# Patient Record
Sex: MALE | Race: WHITE | Employment: FULL TIME | URBAN - METROPOLITAN AREA
[De-identification: names, ages, dates, MRNs, and addresses within clinical notes are randomized per-mention and may not be internally consistent; named-entity substitution may affect disease eponyms.]

---

## 2018-07-02 ENCOUNTER — OFFICE VISIT (OUTPATIENT)
Dept: URGENT CARE | Age: 33
End: 2018-07-02

## 2018-07-02 VITALS
HEART RATE: 91 BPM | HEIGHT: 67 IN | TEMPERATURE: 97.8 F | SYSTOLIC BLOOD PRESSURE: 115 MMHG | BODY MASS INDEX: 21.03 KG/M2 | OXYGEN SATURATION: 99 % | WEIGHT: 134 LBS | RESPIRATION RATE: 18 BRPM | DIASTOLIC BLOOD PRESSURE: 74 MMHG

## 2018-07-02 DIAGNOSIS — R10.13 EPIGASTRIC PAIN: Primary | ICD-10-CM

## 2018-07-02 RX ORDER — MAG HYDROX/ALUMINUM HYD/SIMETH 200-200-20
30 SUSPENSION, ORAL (FINAL DOSE FORM) ORAL ONCE
Qty: 30 ML | Refills: 0 | Status: SHIPPED | COMMUNITY
Start: 2018-07-02 | End: 2018-07-02

## 2018-07-02 RX ORDER — CLONAZEPAM 0.5 MG/1
0.5 TABLET, ORALLY DISINTEGRATING ORAL
Qty: 3 TAB | Refills: 0 | Status: SHIPPED | OUTPATIENT
Start: 2018-07-02 | End: 2018-07-02 | Stop reason: CLARIF

## 2018-07-02 RX ORDER — BACLOFEN 20 MG/1
20 TABLET ORAL 2 TIMES DAILY
Qty: 6 TAB | Refills: 0 | Status: SHIPPED | OUTPATIENT
Start: 2018-07-02 | End: 2020-10-29

## 2018-07-02 RX ORDER — DICYCLOMINE HYDROCHLORIDE 20 MG/1
20 TABLET ORAL EVERY 6 HOURS
Qty: 8 TAB | Refills: 0 | Status: SHIPPED | OUTPATIENT
Start: 2018-07-02 | End: 2020-10-29

## 2018-07-02 RX ORDER — LIDOCAINE HYDROCHLORIDE 20 MG/ML
15 SOLUTION OROPHARYNGEAL
Qty: 15 ML | Refills: 0 | Status: SHIPPED | COMMUNITY
Start: 2018-07-02 | End: 2018-07-02

## 2018-07-02 NOTE — PROGRESS NOTES
Patient is a 27 y.o. male presenting with epigastric pain. Epigastric Pain   This is a new problem. The current episode started less than 1 hour ago. The problem occurs constantly. The problem has not changed since onset. Associated symptoms include chest pain and abdominal pain. Pertinent negatives include no shortness of breath. Associated symptoms comments: Epigastric area- sudden onset- constat- feels like sharp( spasm  Increases on movement involving abdomen  No hiccough/nausea. The symptoms are aggravated by bending and swallowing. Nothing relieves the symptoms. He has tried nothing for the symptoms. History reviewed. No pertinent past medical history. History reviewed. No pertinent surgical history. History reviewed. No pertinent family history. Social History     Social History    Marital status:      Spouse name: N/A    Number of children: N/A    Years of education: N/A     Occupational History    Not on file. Social History Main Topics    Smoking status: Former Smoker    Smokeless tobacco: Never Used    Alcohol use Not on file    Drug use: Not on file    Sexual activity: Not on file     Other Topics Concern    Not on file     Social History Narrative    No narrative on file                ALLERGIES: Review of patient's allergies indicates no known allergies. Review of Systems   Respiratory: Negative for shortness of breath. Cardiovascular: Positive for chest pain. Gastrointestinal: Positive for abdominal pain. All other systems reviewed and are negative. Vitals:    07/02/18 1903   BP: 115/74   Pulse: 91   Resp: 18   Temp: 97.8 °F (36.6 °C)   SpO2: 99%   Weight: 134 lb (60.8 kg)   Height: 5' 7\" (1.702 m)       Physical Exam   Constitutional: No distress.    HENT:   Right Ear: Tympanic membrane and ear canal normal.   Left Ear: Tympanic membrane and ear canal normal.   Nose: Nose normal.   Mouth/Throat: No oropharyngeal exudate, posterior oropharyngeal edema or posterior oropharyngeal erythema. Eyes: Conjunctivae are normal. Right eye exhibits no discharge. Left eye exhibits no discharge. Neck: Neck supple. Cardiovascular: Normal rate, regular rhythm and normal heart sounds. Exam reveals no gallop. No murmur heard. Pulmonary/Chest: Effort normal and breath sounds normal. No respiratory distress. He has no wheezes. He has no rales. Abdominal: Normal appearance. There is no hepatosplenomegaly. There is tenderness in the epigastric area. There is guarding. There is no rebound and no CVA tenderness. No hernia. Lymphadenopathy:     He has no cervical adenopathy. Skin: No rash noted. Nursing note and vitals reviewed. MDM    Procedures      ICD-10-CM ICD-9-CM    1. Epigastric pain R10.13 789.06 EKG, 12 LEAD, INITIAL      DISCONTINUED: clonazePAM (KLONOPIN) 0.5 mg TbDi disintegrating tablet    non cardiac - ? esophageal/ diaphragm spasm       Felt better with GI cocktail    advisied to take pepcid and Mylanta  If pain worsen - go to ED ASAP   Medications Ordered Today   Medications    baclofen (LIORESAL) 20 mg tablet     Sig: Take 1 Tab by mouth two (2) times a day. Dispense:  6 Tab     Refill:  0    dicyclomine (BENTYL) 20 mg tablet     Sig: Take 1 Tab by mouth every six (6) hours. Dispense:  8 Tab     Refill:  0    DISCONTD: clonazePAM (KLONOPIN) 0.5 mg TbDi disintegrating tablet     Sig: Take 1 Tab by mouth three (3) times daily as needed. Max Daily Amount: 1.5 mg.     Dispense:  3 Tab     Refill:  0    alum-mag hydroxide-simeth (MYLANTA) 200-200-20 mg/5 mL susp     Sig: Take 30 mL by mouth once for 1 dose.      Dispense:  30 mL     Refill:  0     Order Specific Question:   Expiration Date     Answer:   2/2/2020     Order Specific Question:   Lot#     Answer:   [id number]     Order Specific Question:        Answer:   Conrado Le     Order Specific Question:   NDC#     Answer:   [id number]    lidocaine (XYLOCAINE) 2 % solution Sig: Take 15 mL by mouth now for 1 dose. Dispense:  15 mL     Refill:  0     Order Specific Question:   Expiration Date     Answer:   10/2/2020     Order Specific Question:   Lot#     Answer:   [id number]     Order Specific Question:        Answer:   Albert Grant     Order Specific Question:   NDC#     Answer:   [id number]     No results found for any visits on 07/02/18. The patients condition was discussed with the patient and they understand. The patient is to follow up with primary care doctor. If signs and symptoms become worse the pt is to go to the ER. The patient is to take medications as prescribed.

## 2018-07-02 NOTE — MR AVS SNAPSHOT
[address] [place] [address] 
[phone] Patient: Catracho Griffith 
MRN: [id number] PWI:[id number] Visit Information Date & Time Provider Department Dept. Phone Encounter #  
 7/2/2018  7:00 PM [facility] [phone] [id number] Follow-up Instructions Return if symptoms worsen or fail to improve, for Follow up with PCP/ , Go to ED if sxs Worsen. Upcoming Health Maintenance Date Due DTaP/Tdap/Td series (1 - Tdap) 8/19/2004 Influenza Age 5 to Adult 8/1/2018 Allergies as of 7/2/2018  Review Complete On: 7/2/2018 By: Meet Saini RN No Known Allergies Current Immunizations  Never Reviewed No immunizations on file. Not reviewed this visit You Were Diagnosed With   
  
 Codes Comments Epigastric pain    -  Primary ICD-10-CM: R10.13 ICD-9-CM: 789.06 non cardiac - ? esophageal/ diaphragm spasm Vitals BP Pulse Temp Resp Height(growth percentile) Weight(growth percentile) 115/74 91 97.8 °F (36.6 °C) 18 5' 7\" (1.702 m) 134 lb (60.8 kg) SpO2 BMI Smoking Status 99% 20.99 kg/m2 Former Smoker BMI and BSA Data Body Mass Index Body Surface Area  
 20.99 kg/m 2 1.7 m 2 Your Updated Medication List  
  
   
This list is accurate as of 7/2/18  7:30 PM.  Always use your most recent med list.  
  
  
  
  
 baclofen 20 mg tablet Commonly known as:  LIORESAL Take 1 Tab by mouth two (2) times a day. clonazePAM 0.5 mg Tbdi disintegrating tablet Commonly known as:  Brea Nahun Take 1 Tab by mouth three (3) times daily as needed. Max Daily Amount: 1.5 mg.  
  
 dicyclomine 20 mg tablet Commonly known as:  BENTYL Take 1 Tab by mouth every six (6) hours. Prescriptions Printed Refills  
 baclofen (LIORESAL) 20 mg tablet 0 Sig: Take 1 Tab by mouth two (2) times a day. Class: Print  Route: Oral  
 dicyclomine (BENTYL) 20 mg tablet 0 Sig: Take 1 Tab by mouth every six (6) hours. Class: Print Route: Oral  
 clonazePAM (KLONOPIN) 0.5 mg TbDi disintegrating tablet 0 Sig: Take 1 Tab by mouth three (3) times daily as needed. Max Daily Amount: 1.5 mg.  
 Class: Print Route: Oral  
  
We Performed the Following EKG, 12 LEAD, INITIAL [76828 CPT(R)] Follow-up Instructions Return if symptoms worsen or fail to improve, for Follow up with PCP/ , Go to ED if sxs Worsen. Patient Instructions Abdominal Pain: Care Instructions Your Care Instructions Abdominal pain has many possible causes. Some aren't serious and get better on their own in a few days. Others need more testing and treatment. If your pain continues or gets worse, you need to be rechecked and may need more tests to find out what is wrong. You may need surgery to correct the problem. Don't ignore new symptoms, such as fever, nausea and vomiting, urination problems, pain that gets worse, and dizziness. These may be signs of a more serious problem. Your doctor may have recommended a follow-up visit in the next 8 to 12 hours. If you are not getting better, you may need more tests or treatment. The doctor has checked you carefully, but problems can develop later. If you notice any problems or new symptoms, get medical treatment right away. Follow-up care is a key part of your treatment and safety. Be sure to make and go to all appointments, and call your doctor if you are having problems. It's also a good idea to know your test results and keep a list of the medicines you take. How can you care for yourself at home? · Rest until you feel better. · To prevent dehydration, drink plenty of fluids, enough so that your urine is light yellow or clear like water. Choose water and other caffeine-free clear liquids until you feel better.  If you have kidney, heart, or liver disease and have to limit fluids, talk with your doctor before you increase the amount of fluids you drink. · If your stomach is upset, eat mild foods, such as rice, dry toast or crackers, bananas, and applesauce. Try eating several small meals instead of two or three large ones. · Wait until 48 hours after all symptoms have gone away before you have spicy foods, alcohol, and drinks that contain caffeine. · Do not eat foods that are high in fat. · Avoid anti-inflammatory medicines such as aspirin, ibuprofen (Advil, Motrin), and naproxen (Aleve). These can cause stomach upset. Talk to your doctor if you take daily aspirin for another health problem. When should you call for help? Call 911 anytime you think you may need emergency care. For example, call if: 
? · You passed out (lost consciousness). ? · You pass maroon or very bloody stools. ? · You vomit blood or what looks like coffee grounds. ? · You have new, severe belly pain. ?Call your doctor now or seek immediate medical care if: 
? · Your pain gets worse, especially if it becomes focused in one area of your belly. ? · You have a new or higher fever. ? · Your stools are black and look like tar, or they have streaks of blood. ? · You have unexpected vaginal bleeding. ? · You have symptoms of a urinary tract infection. These may include: 
¨ Pain when you urinate. ¨ Urinating more often than usual. 
¨ Blood in your urine. ? · You are dizzy or lightheaded, or you feel like you may faint. ? Watch closely for changes in your health, and be sure to contact your doctor if: 
? · You are not getting better after 1 day (24 hours). Where can you learn more? Go to http://adarsh-antonia.info/. Enter J078 in the search box to learn more about \"Abdominal Pain: Care Instructions. \" Current as of: March 20, 2017 Content Version: 11.4 © 4339-7336 Healthwise, Incorporated.  Care instructions adapted under license by [address] (which disclaims liability or warranty for this information). If you have questions about a medical condition or this instruction, always ask your healthcare professional. [address] any warranty or liability for your use of this information. Chest Pain: Care Instructions Your Care Instructions There are many things that can cause chest pain. Some are not serious and will get better on their own in a few days. But some kinds of chest pain need more testing and treatment. Your doctor may have recommended a follow-up visit in the next 8 to 12 hours. If you are not getting better, you may need more tests or treatment. Even though your doctor has released you, you still need to watch for any problems. The doctor carefully checked you, but sometimes problems can develop later. If you have new symptoms or if your symptoms do not get better, get medical care right away. If you have worse or different chest pain or pressure that lasts more than 5 minutes or you passed out (lost consciousness), call 911 or seek other emergency help right away. A medical visit is only one step in your treatment. Even if you feel better, you still need to do what your doctor recommends, such as going to all suggested follow-up appointments and taking medicines exactly as directed. This will help you recover and help prevent future problems. How can you care for yourself at home? · Rest until you feel better. · Take your medicine exactly as prescribed. Call your doctor if you think you are having a problem with your medicine. · Do not drive after taking a prescription pain medicine. When should you call for help? Call 911 if: 
? · You passed out (lost consciousness). ? · You have severe difficulty breathing. ? · You have symptoms of a heart attack. These may include: ¨ Chest pain or pressure, or a strange feeling in your chest. 
¨ Sweating. ¨ Shortness of breath. ¨ Nausea or vomiting. ¨ Pain, pressure, or a strange feeling in your back, neck, jaw, or upper belly or in one or both shoulders or arms. ¨ Lightheadedness or sudden weakness. ¨ A fast or irregular heartbeat. After you call 911, the  may tell you to chew 1 adult-strength or 2 to 4 low-dose aspirin. Wait for an ambulance. Do not try to drive yourself. ?Call your doctor today if: 
? · You have any trouble breathing. ? · Your chest pain gets worse. ? · You are dizzy or lightheaded, or you feel like you may faint. ? · You are not getting better as expected. ? · You are having new or different chest pain. Where can you learn more? Go to http://adarsh-antonia.info/. Enter A120 in the search box to learn more about \"Chest Pain: Care Instructions. \" Current as of: March 20, 2017 Content Version: 11.4 © 2561-4041 [employer]. Care instructions adapted under license by [employer] (which disclaims liability or warranty for this information). If you have questions about a medical condition or this instruction, always ask your healthcare professional. [place] [address] any warranty or liability for your use of this information. Introducing [facility]! [facility] introduces Indow Windows patient portal. Now you can access parts of your medical record, email your doctor's office, and request medication refills online. 1. In your internet browser, go to [email or web address] 2. Click on the First Time User? Click Here link in the Sign In box. You will see the New Member Sign Up page. 3. Enter your Indow Windows Access Code exactly as it appears below. You will not need to use this code after youve completed the sign-up process. If you do not sign up before the expiration date, you must request a new code. · Indow Windows Access Code: [email or web address] Expires: 9/30/2018  7:30 PM 
 
 4. Enter the last four digits of your Social Security Number (xxxx) and Date of Birth (mm/dd/yyyy) as indicated and click Submit. You will be taken to the next sign-up page. 5. Create a TRiQ ID. This will be your TRiQ login ID and cannot be changed, so think of one that is secure and easy to remember. 6. Create a TRiQ password. You can change your password at any time. 7. Enter your Password Reset Question and Answer. This can be used at a later time if you forget your password. 8. Enter your e-mail address. You will receive e-mail notification when new information is available in [address] 9. Click Sign Up. You can now view and download portions of your medical record. 10. Click the Download Summary menu link to download a portable copy of your medical information. If you have questions, please visit the Frequently Asked Questions section of the TRiQ website. Remember, TRiQ is NOT to be used for urgent needs. For medical emergencies, dial 911. Now available from your iPhone and Android! Please provide this summary of care documentation to your next provider. If you have any questions after today's visit, please call [phone].

## 2020-10-29 ENCOUNTER — HOSPITAL ENCOUNTER (EMERGENCY)
Age: 35
Discharge: HOME | End: 2020-10-29
Attending: STUDENT IN AN ORGANIZED HEALTH CARE EDUCATION/TRAINING PROGRAM
Payer: COMMERCIAL

## 2020-10-29 ENCOUNTER — APPOINTMENT (OUTPATIENT)
Dept: CT IMAGING | Age: 35
End: 2020-10-29
Attending: STUDENT IN AN ORGANIZED HEALTH CARE EDUCATION/TRAINING PROGRAM
Payer: COMMERCIAL

## 2020-10-29 ENCOUNTER — APPOINTMENT (OUTPATIENT)
Dept: GENERAL RADIOLOGY | Age: 35
End: 2020-10-29
Attending: EMERGENCY MEDICINE
Payer: COMMERCIAL

## 2020-10-29 VITALS
SYSTOLIC BLOOD PRESSURE: 127 MMHG | WEIGHT: 142.64 LBS | HEIGHT: 67 IN | DIASTOLIC BLOOD PRESSURE: 83 MMHG | RESPIRATION RATE: 23 BRPM | BODY MASS INDEX: 22.39 KG/M2 | TEMPERATURE: 98.1 F | OXYGEN SATURATION: 100 % | HEART RATE: 86 BPM

## 2020-10-29 DIAGNOSIS — K58.9 SPASM OF BOWEL: Primary | ICD-10-CM

## 2020-10-29 LAB
ALBUMIN SERPL-MCNC: 4 G/DL (ref 3.5–5)
ALBUMIN/GLOB SERPL: 0.9 {RATIO} (ref 1.1–2.2)
ALP SERPL-CCNC: 82 U/L (ref 45–117)
ALT SERPL-CCNC: 64 U/L (ref 12–78)
ANION GAP SERPL CALC-SCNC: 5 MMOL/L (ref 5–15)
AST SERPL-CCNC: 124 U/L (ref 15–37)
ATRIAL RATE: 88 BPM
BASOPHILS # BLD: 0 K/UL (ref 0–0.1)
BASOPHILS NFR BLD: 1 % (ref 0–1)
BILIRUB SERPL-MCNC: 0.5 MG/DL (ref 0.2–1)
BNP SERPL-MCNC: 5 PG/ML
BUN SERPL-MCNC: 17 MG/DL (ref 6–20)
BUN/CREAT SERPL: 18 (ref 12–20)
CALCIUM SERPL-MCNC: 9 MG/DL (ref 8.5–10.1)
CALCULATED R AXIS, ECG10: 49 DEGREES
CALCULATED T AXIS, ECG11: 72 DEGREES
CHLORIDE SERPL-SCNC: 109 MMOL/L (ref 97–108)
CO2 SERPL-SCNC: 23 MMOL/L (ref 21–32)
CREAT SERPL-MCNC: 0.97 MG/DL (ref 0.7–1.3)
D DIMER PPP FEU-MCNC: 0.3 MG/L FEU (ref 0–0.65)
DIAGNOSIS, 93000: NORMAL
DIFFERENTIAL METHOD BLD: ABNORMAL
EOSINOPHIL # BLD: 0.1 K/UL (ref 0–0.4)
EOSINOPHIL NFR BLD: 1 % (ref 0–7)
ERYTHROCYTE [DISTWIDTH] IN BLOOD BY AUTOMATED COUNT: 11.8 % (ref 11.5–14.5)
GLOBULIN SER CALC-MCNC: 4.3 G/DL (ref 2–4)
GLUCOSE SERPL-MCNC: 96 MG/DL (ref 65–100)
HCT VFR BLD AUTO: 43.5 % (ref 36.6–50.3)
HGB BLD-MCNC: 14.7 G/DL (ref 12.1–17)
IMM GRANULOCYTES # BLD AUTO: 0 K/UL (ref 0–0.04)
IMM GRANULOCYTES NFR BLD AUTO: 1 % (ref 0–0.5)
LIPASE SERPL-CCNC: 475 U/L (ref 73–393)
LYMPHOCYTES # BLD: 1.4 K/UL (ref 0.8–3.5)
LYMPHOCYTES NFR BLD: 17 % (ref 12–49)
MCH RBC QN AUTO: 28.9 PG (ref 26–34)
MCHC RBC AUTO-ENTMCNC: 33.8 G/DL (ref 30–36.5)
MCV RBC AUTO: 85.6 FL (ref 80–99)
MONOCYTES # BLD: 0.4 K/UL (ref 0–1)
MONOCYTES NFR BLD: 5 % (ref 5–13)
NEUTS SEG # BLD: 6.2 K/UL (ref 1.8–8)
NEUTS SEG NFR BLD: 75 % (ref 32–75)
NRBC # BLD: 0 K/UL (ref 0–0.01)
NRBC BLD-RTO: 0 PER 100 WBC
P-R INTERVAL, ECG05: 168 MS
PLATELET # BLD AUTO: 185 K/UL (ref 150–400)
PMV BLD AUTO: 8.9 FL (ref 8.9–12.9)
POTASSIUM SERPL-SCNC: 3.9 MMOL/L (ref 3.5–5.1)
PROT SERPL-MCNC: 8.3 G/DL (ref 6.4–8.2)
Q-T INTERVAL, ECG07: 340 MS
QRS DURATION, ECG06: 74 MS
QTC CALCULATION (BEZET), ECG08: 411 MS
RBC # BLD AUTO: 5.08 M/UL (ref 4.1–5.7)
SODIUM SERPL-SCNC: 137 MMOL/L (ref 136–145)
TROPONIN I SERPL-MCNC: <0.05 NG/ML
VENTRICULAR RATE, ECG03: 88 BPM
WBC # BLD AUTO: 8.1 K/UL (ref 4.1–11.1)

## 2020-10-29 PROCEDURE — 85025 COMPLETE CBC W/AUTO DIFF WBC: CPT

## 2020-10-29 PROCEDURE — 83690 ASSAY OF LIPASE: CPT

## 2020-10-29 PROCEDURE — 74011250637 HC RX REV CODE- 250/637: Performed by: STUDENT IN AN ORGANIZED HEALTH CARE EDUCATION/TRAINING PROGRAM

## 2020-10-29 PROCEDURE — 80053 COMPREHEN METABOLIC PANEL: CPT

## 2020-10-29 PROCEDURE — 93005 ELECTROCARDIOGRAM TRACING: CPT

## 2020-10-29 PROCEDURE — 83880 ASSAY OF NATRIURETIC PEPTIDE: CPT

## 2020-10-29 PROCEDURE — 99284 EMERGENCY DEPT VISIT MOD MDM: CPT

## 2020-10-29 PROCEDURE — 74177 CT ABD & PELVIS W/CONTRAST: CPT

## 2020-10-29 PROCEDURE — 85379 FIBRIN DEGRADATION QUANT: CPT

## 2020-10-29 PROCEDURE — 36415 COLL VENOUS BLD VENIPUNCTURE: CPT

## 2020-10-29 PROCEDURE — 84484 ASSAY OF TROPONIN QUANT: CPT

## 2020-10-29 PROCEDURE — 74011000636 HC RX REV CODE- 636

## 2020-10-29 PROCEDURE — 71046 X-RAY EXAM CHEST 2 VIEWS: CPT

## 2020-10-29 RX ORDER — SODIUM CHLORIDE 0.9 % (FLUSH) 0.9 %
10 SYRINGE (ML) INJECTION
Status: COMPLETED | OUTPATIENT
Start: 2020-10-29 | End: 2020-10-29

## 2020-10-29 RX ORDER — DICYCLOMINE HYDROCHLORIDE 10 MG/1
10 CAPSULE ORAL 4 TIMES DAILY
Qty: 20 CAP | Refills: 0 | Status: SHIPPED | OUTPATIENT
Start: 2020-10-29 | End: 2020-11-03

## 2020-10-29 RX ORDER — DICYCLOMINE HYDROCHLORIDE 10 MG/1
20 CAPSULE ORAL
Status: COMPLETED | OUTPATIENT
Start: 2020-10-29 | End: 2020-10-29

## 2020-10-29 RX ADMIN — IOPAMIDOL 100 ML: 755 INJECTION, SOLUTION INTRAVENOUS at 06:55

## 2020-10-29 RX ADMIN — DICYCLOMINE HYDROCHLORIDE 20 MG: 10 CAPSULE ORAL at 07:44

## 2020-10-29 RX ADMIN — Medication 10 ML: at 06:55

## 2020-10-29 NOTE — ED NOTES
Received report from DONALD Laboy. This RN was informed of patient chief complaint, current status, orders completed (to include IV access/medications/radiology testing), outstanding orders that still need to be completed, and the treatment plan. All questions and concerns regarding the patient were addressed.

## 2020-10-29 NOTE — DISCHARGE INSTRUCTIONS
[facility] SYSTEMS Departments     For adult and child immunizations, family planning, TB screening, STD testing and women's health services. [facility]: [place] [phone]      [facility] 25   [address]   [address]   170 [facility]: Joyce Diop [address] [facility] [address] [phone]      2400 [facility]          [place] [place] [address]     For primary care services, woman and child wellness, and some clinics providing specialty care. [facility] -- [address] 2525 [facility] [phone]   411 [facility] 200 [facility] 3614 [facility] [phone]   339 [facility] [address] [phone] [address]  [email or web address] [address] [place] [address] 5850  Community  [phone]   [address] [place] [address] [address] [phone]   [facility] 81 [facility] [phone]   [facility] [address] [place] [address] [phone]   [facility]: [facility] [address], ext [address] [place] [address], [address] [phone]     [facility] [address] [facility] [address] [phone]   [facility] 5850 [facility]  [phone]   [employer]  [address], [address] ([email or web address]) [phone]         Sexual Health/Woman Wellness Clinics    For STD/HIV testing and treatment, pregnancy testing and services, men's health, birth control services, LGBT services, and hepatitis/HPV vaccine services. [facility] for [place] [employer] [address] [facility] [address] [facility] [address] [facility] 1579 [address]gu[address] [phone]   [facility] [address], 5th floor [phone]   Pregnancy [address] for Women [facility] DUSTY Sorto [phone]         Specialty Service 1701 [facility]   [phone]   [place]   [phone]   Women, Infant and Children's Services: [address] [phone]       600 [facility]   [phone]   [address]   1620 [facility]     [phone]   [address] Crisis   [address] [facility] [address] [phone]       Local Primary Care Physicians  [facility] [phone]  MD Spencer Saxena MD Rande French, MD [facility] Doctors [phone]  Rosa Isela Contreras, P  MD Prem Carpenter MD Emerick Serge, MD [address] [place] [address] [phone]  MD Marco Gorman MD [address] [facility] [phone]  MD Bianca Montes MD Karilyn Asal, MD Rosemarie Alas, MD   [facility] [phone]  [facility] , MD Su Londono, NP [address] [phone]  MD Jhon Garcia MD Donell Martinez, MD Ferdinand Carl, MD Unknown Abernethy, MD Margarett Hopes, MD Evone Cornet, MD   33 57 [facility]  Marixa Canales MD [address] [phone]  MD Dario Sen, MD Conrad Amin MD Jacolyn Lah, MD Fatima Oak, MD Orlin Kettle, MD   9615 [facility] [phone]  MD Kathryn Daily, FNP  Pilo Vegas, NP  MD Haleigh Lemus MD Randon Downy, MD Mora Bayley, MD [facility] [phone]  MD Daniella Narvaez MD Olena Gavia, MD Cathlyn Garner, MD Charlyn Mons, MD   [address] [phone]  MD Raúl Pimentel MD [place] [phone]  MD Saran Patel MD Ritchie Morning Marcela [place], [address] [facility] [phone]  MD Isaac Jacques, MD Jorge Angel, MD Penelope Hernandez, MD Teri Carver, ADRIANA Gold MD [address]   [phone]  MD Wiliam Valdes [facility], MD Dick Mathew MD   2102 [facility] [phone]  Twyla Gonzalez, MD Karina Snyder, FNP  Ulysses Corporal, PA-C  Ulysses Corporal, FNP  Macy Lamp, PA-C  Yohan Mayers, MD José Kapadia, ADRIANA Roy, DO Miscellaneous:  Dre Corrigan [phone]

## 2020-10-29 NOTE — LETTER
Mirandamanny Johnson was seen and treated in our emergency department on 10/29/2020. He may return to work on 10/30/2020 If you have any questions or concerns, please don't hesitate to call. Dr. Jd Toney

## 2020-10-29 NOTE — ED NOTES
Patient w abdominal/chest pain onset about 0200. Patient denies any pain like this before. He states he is unable to lay down. Denies any SOB. Dr. Robin Kc to bedside for evaluation w this writer at this time. 3093- Bedside shift change report given to Greta Nolan RN (oncoming nurse) by Ara Mayes RN (offgoing nurse). Report included the following information SBAR, Kardex and Recent Results.

## 2020-10-29 NOTE — ED PROVIDER NOTES
EMERGENCY DEPARTMENT HISTORY AND PHYSICAL EXAM      Date: 10/29/2020  Patient Name: Francisca Greer    History of Presenting Illness     Chief Complaint   Patient presents with    Chest Pain     Pt ambulatory to triage with c/o mid CP radiating from mid abdomen; denies N/V/D; denies cardiac or GI hx    Abdominal Pain       History Provided By: Patient    HPI: Francisca Greer, 38 y.o. male presents to the ED with cc of upper abdomen/lower chest pain that began overnight. Patient states that he has never had symptoms like this before. Denies radiation of pain. Denies associated N/V/D/urinary complaints/fevers/chills. There are no other complaints, changes, or physical findings at this time. PCP: None    No current facility-administered medications on file prior to encounter. Current Outpatient Medications on File Prior to Encounter   Medication Sig Dispense Refill    [DISCONTINUED] baclofen (LIORESAL) 20 mg tablet Take 1 Tab by mouth two (2) times a day. 6 Tab 0    [DISCONTINUED] dicyclomine (BENTYL) 20 mg tablet Take 1 Tab by mouth every six (6) hours. 8 Tab 0       Past History     Past Medical History:  History reviewed. No pertinent past medical history. Past Surgical History:  History reviewed. No pertinent surgical history. Family History:  History reviewed. No pertinent family history. Social History:  Social History     Tobacco Use    Smoking status: Former Smoker    Smokeless tobacco: Never Used   Substance Use Topics    Alcohol use: Not Currently    Drug use: Not Currently       Allergies:  No Known Allergies      Review of Systems   Review of Systems   Constitutional: Negative for chills and fever. HENT: Negative for congestion and rhinorrhea. Eyes: Negative for visual disturbance. Respiratory: Negative for chest tightness and shortness of breath. Cardiovascular: Negative for chest pain and palpitations. Gastrointestinal: Positive for abdominal pain.  Negative for diarrhea, nausea and vomiting. Genitourinary: Negative for dysuria, flank pain and hematuria. Musculoskeletal: Negative for back pain and neck pain. Skin: Negative for rash. Allergic/Immunologic: Negative for immunocompromised state. Neurological: Negative for dizziness, speech difficulty, weakness and headaches. Hematological: Negative for adenopathy. Psychiatric/Behavioral: Negative for dysphoric mood and suicidal ideas. Physical Exam   Physical Exam  Vitals signs and nursing note reviewed. Constitutional:       General: He is not in acute distress. Appearance: Normal appearance. He is not ill-appearing or toxic-appearing. HENT:      Head: Normocephalic and atraumatic. Nose: Nose normal.      Mouth/Throat:      Mouth: Mucous membranes are moist.   Eyes:      Extraocular Movements: Extraocular movements intact. Pupils: Pupils are equal, round, and reactive to light. Neck:      Musculoskeletal: Normal range of motion and neck supple. Cardiovascular:      Rate and Rhythm: Normal rate and regular rhythm. Pulses: Normal pulses. Pulmonary:      Effort: Pulmonary effort is normal. No respiratory distress. Breath sounds: Normal breath sounds. No stridor. No wheezing or rhonchi. Abdominal:      General: Abdomen is flat. There is no distension. Palpations: There is no mass. Tenderness: There is abdominal tenderness in the right upper quadrant and epigastric area. There is no right CVA tenderness, left CVA tenderness, guarding or rebound. Musculoskeletal: Normal range of motion. Skin:     General: Skin is warm and dry. Neurological:      General: No focal deficit present. Mental Status: He is alert. Mental status is at baseline. Sensory: No sensory deficit. Motor: No weakness.          Diagnostic Study Results     Labs -     Recent Results (from the past 12 hour(s))   EKG, 12 LEAD, INITIAL    Collection Time: 10/29/20  4:31 AM   Result Value Ref Range Ventricular Rate 88 BPM    Atrial Rate 88 BPM    P-R Interval 168 ms    QRS Duration 74 ms    Q-T Interval 340 ms    QTC Calculation (Bezet) 411 ms    Calculated R Axis 49 degrees    Calculated T Axis 72 degrees    Diagnosis       Normal sinus rhythm  Septal infarct , age undetermined  No previous ECGs available     CBC WITH AUTOMATED DIFF    Collection Time: 10/29/20  4:39 AM   Result Value Ref Range    WBC 8.1 4.1 - 11.1 K/uL    RBC 5.08 4.10 - 5.70 M/uL    HGB 14.7 12.1 - 17.0 g/dL    HCT 43.5 36.6 - 50.3 %    MCV 85.6 80.0 - 99.0 FL    MCH 28.9 26.0 - 34.0 PG    MCHC 33.8 30.0 - 36.5 g/dL    RDW 11.8 11.5 - 14.5 %    PLATELET 466 741 - 289 K/uL    MPV 8.9 8.9 - 12.9 FL    NRBC 0.0 0  WBC    ABSOLUTE NRBC 0.00 0.00 - 0.01 K/uL    NEUTROPHILS 75 32 - 75 %    LYMPHOCYTES 17 12 - 49 %    MONOCYTES 5 5 - 13 %    EOSINOPHILS 1 0 - 7 %    BASOPHILS 1 0 - 1 %    IMMATURE GRANULOCYTES 1 (H) 0.0 - 0.5 %    ABS. NEUTROPHILS 6.2 1.8 - 8.0 K/UL    ABS. LYMPHOCYTES 1.4 0.8 - 3.5 K/UL    ABS. MONOCYTES 0.4 0.0 - 1.0 K/UL    ABS. EOSINOPHILS 0.1 0.0 - 0.4 K/UL    ABS. BASOPHILS 0.0 0.0 - 0.1 K/UL    ABS. IMM. GRANS. 0.0 0.00 - 0.04 K/UL    DF AUTOMATED     METABOLIC PANEL, COMPREHENSIVE    Collection Time: 10/29/20  4:39 AM   Result Value Ref Range    Sodium 137 136 - 145 mmol/L    Potassium 3.9 3.5 - 5.1 mmol/L    Chloride 109 (H) 97 - 108 mmol/L    CO2 23 21 - 32 mmol/L    Anion gap 5 5 - 15 mmol/L    Glucose 96 65 - 100 mg/dL    BUN 17 6 - 20 MG/DL    Creatinine 0.97 0.70 - 1.30 MG/DL    BUN/Creatinine ratio 18 12 - 20      GFR est AA >60 >60 ml/min/1.73m2    GFR est non-AA >60 >60 ml/min/1.73m2    Calcium 9.0 8.5 - 10.1 MG/DL    Bilirubin, total 0.5 0.2 - 1.0 MG/DL    ALT (SGPT) 64 12 - 78 U/L    AST (SGOT) 124 (H) 15 - 37 U/L    Alk.  phosphatase 82 45 - 117 U/L    Protein, total 8.3 (H) 6.4 - 8.2 g/dL    Albumin 4.0 3.5 - 5.0 g/dL    Globulin 4.3 (H) 2.0 - 4.0 g/dL    A-G Ratio 0.9 (L) 1.1 - 2.2     NT-PRO BNP Collection Time: 10/29/20  4:39 AM   Result Value Ref Range    NT pro-BNP 5 <125 PG/ML   TROPONIN I    Collection Time: 10/29/20  4:39 AM   Result Value Ref Range    Troponin-I, Qt. <0.05 <0.05 ng/mL   D DIMER    Collection Time: 10/29/20  4:39 AM   Result Value Ref Range    D-dimer 0.30 0.00 - 0.65 mg/L FEU       Radiologic Studies -   XR CHEST PA LAT   Final Result   IMPRESSION: No acute intrathoracic disease. CT ABD PELV W CONT    (Results Pending)     CT Results  (Last 48 hours)    None        CXR Results  (Last 48 hours)               10/29/20 0627  XR CHEST PA LAT Final result    Impression:  IMPRESSION: No acute intrathoracic disease. Narrative:  Clinical history: cp   INDICATION:   cp   COMPARISON: None       FINDINGS:    PA and lateral views of the chest are obtained. The cardiopericardial silhouette is within normal limits. There is no pleural   effusion, pneumothorax or focal consolidation present. Medical Decision Making   I am the first provider for this patient. I reviewed the vital signs, available nursing notes, past medical history, past surgical history, family history and social history. Vital Signs-Reviewed the patient's vital signs. Patient Vitals for the past 12 hrs:   Temp Pulse Resp BP SpO2   10/29/20 0630 -- 87 23 123/76 100 %   10/29/20 0620 -- -- -- 130/78 --   10/29/20 0442 97.9 °F (36.6 °C) 84 18 -- 100 %       EKG interpretation: (Preliminary)  NSR, rate of 88, no acute ST changes. Normal Qtc. Initial interpretation by Jazlyn Hernandes MD    Records Reviewed: Nursing Notes, Old Medical Records, Previous electrocardiograms, Previous Radiology Studies and Previous Laboratory Studies    Provider Notes (Medical Decision Making):   43-year-old male presenting for evaluation of upper abdomen/lower chest pain. Vitals are stable. EKG is nonischemic. Troponin is negative. BNP and D-dimer ordered by triage, and both are negative.   Basic labs without leukocytosis. LFTs, renal function, electrolytes within normal limits. Lipase mildly elevated at 475, does not meet criteria for pancreatitis. CT abdomen pelvis negative for acute process to explain patient's present symptoms. Reviewed patient's chart, despite him saying he has never experienced similar symptoms-it appears that he visited urgent care in July 2018 with a similar presentation. When questioned about this, patient now states maybe he has had similar symptoms. It appears that he received Bentyl on that visit, and patient endorses previously experiencing relief of symptoms with this medication. He was subsequently given 20 mg of Bentyl here. He will be discharged home in stable condition with a prescription for Bentyl as well. Advised to follow-up with primary care physician. Return precautions discussed. ED Course:   Initial assessment performed. The patients presenting problems have been discussed, and they are in agreement with the care plan formulated and outlined with them. I have encouraged them to ask questions as they arise throughout their visit. William España MD      Disposition:  Discharge    DISCHARGE PLAN:  1. Discharge Medication List as of 10/29/2020  7:38 AM      START taking these medications    Details   dicyclomine (BentyL) 10 mg capsule Take 1 Cap by mouth four (4) times daily for 5 days. , Normal, Disp-20 Cap,R-0           2. Follow-up Information     Follow up With Specialties Details Why Contact Info    PCP            3. Return to ED if worse     Diagnosis     Clinical Impression:   1. Spasm of bowel        Attestations:    William España MD    Please note that this dictation was completed with Sean[employer], the computer voice recognition software. Quite often unanticipated grammatical, syntax, homophones, and other interpretive errors are inadvertently transcribed by the computer software. Please disregard these errors.   Please excuse any errors that have escaped final proofreading. Thank you.

## 2022-04-07 ENCOUNTER — HOSPITAL ENCOUNTER (EMERGENCY)
Age: 37
Discharge: HOME | End: 2022-04-08
Attending: EMERGENCY MEDICINE
Payer: COMMERCIAL

## 2022-04-07 VITALS
DIASTOLIC BLOOD PRESSURE: 83 MMHG | HEART RATE: 61 BPM | HEIGHT: 67 IN | TEMPERATURE: 98 F | SYSTOLIC BLOOD PRESSURE: 112 MMHG | OXYGEN SATURATION: 100 % | WEIGHT: 150 LBS | BODY MASS INDEX: 23.54 KG/M2 | RESPIRATION RATE: 18 BRPM

## 2022-04-07 DIAGNOSIS — R42 VERTIGO: Primary | ICD-10-CM

## 2022-04-07 PROCEDURE — 93005 ELECTROCARDIOGRAM TRACING: CPT

## 2022-04-07 PROCEDURE — 85025 COMPLETE CBC W/AUTO DIFF WBC: CPT

## 2022-04-07 PROCEDURE — 36415 COLL VENOUS BLD VENIPUNCTURE: CPT

## 2022-04-07 PROCEDURE — 80053 COMPREHEN METABOLIC PANEL: CPT

## 2022-04-07 PROCEDURE — 99285 EMERGENCY DEPT VISIT HI MDM: CPT

## 2022-04-08 ENCOUNTER — APPOINTMENT (OUTPATIENT)
Dept: CT IMAGING | Age: 37
End: 2022-04-08
Attending: EMERGENCY MEDICINE
Payer: COMMERCIAL

## 2022-04-08 LAB
ALBUMIN SERPL-MCNC: 3.8 G/DL (ref 3.5–5)
ALBUMIN/GLOB SERPL: 1 {RATIO} (ref 1.1–2.2)
ALP SERPL-CCNC: 61 U/L (ref 45–117)
ALT SERPL-CCNC: 26 U/L (ref 12–78)
ANION GAP SERPL CALC-SCNC: 5 MMOL/L (ref 5–15)
AST SERPL-CCNC: 19 U/L (ref 15–37)
BASOPHILS # BLD: 0 K/UL (ref 0–0.1)
BASOPHILS NFR BLD: 1 % (ref 0–1)
BILIRUB SERPL-MCNC: 0.2 MG/DL (ref 0.2–1)
BUN SERPL-MCNC: 16 MG/DL (ref 6–20)
BUN/CREAT SERPL: 17 (ref 12–20)
CALCIUM SERPL-MCNC: 8.7 MG/DL (ref 8.5–10.1)
CHLORIDE SERPL-SCNC: 105 MMOL/L (ref 97–108)
CO2 SERPL-SCNC: 28 MMOL/L (ref 21–32)
CREAT SERPL-MCNC: 0.93 MG/DL (ref 0.7–1.3)
DIFFERENTIAL METHOD BLD: ABNORMAL
EOSINOPHIL # BLD: 0.2 K/UL (ref 0–0.4)
EOSINOPHIL NFR BLD: 2 % (ref 0–7)
ERYTHROCYTE [DISTWIDTH] IN BLOOD BY AUTOMATED COUNT: 11.8 % (ref 11.5–14.5)
GLOBULIN SER CALC-MCNC: 3.9 G/DL (ref 2–4)
GLUCOSE SERPL-MCNC: 110 MG/DL (ref 65–100)
HCT VFR BLD AUTO: 40.6 % (ref 36.6–50.3)
HGB BLD-MCNC: 13.8 G/DL (ref 12.1–17)
IMM GRANULOCYTES # BLD AUTO: 0 K/UL (ref 0–0.04)
IMM GRANULOCYTES NFR BLD AUTO: 0 % (ref 0–0.5)
LYMPHOCYTES # BLD: 2.2 K/UL (ref 0.8–3.5)
LYMPHOCYTES NFR BLD: 27 % (ref 12–49)
MCH RBC QN AUTO: 29.5 PG (ref 26–34)
MCHC RBC AUTO-ENTMCNC: 34 G/DL (ref 30–36.5)
MCV RBC AUTO: 86.8 FL (ref 80–99)
MONOCYTES # BLD: 0.9 K/UL (ref 0–1)
MONOCYTES NFR BLD: 11 % (ref 5–13)
NEUTS SEG # BLD: 4.8 K/UL (ref 1.8–8)
NEUTS SEG NFR BLD: 59 % (ref 32–75)
NRBC # BLD: 0 K/UL (ref 0–0.01)
NRBC BLD-RTO: 0 PER 100 WBC
PLATELET # BLD AUTO: 198 K/UL (ref 150–400)
PMV BLD AUTO: 8.4 FL (ref 8.9–12.9)
POTASSIUM SERPL-SCNC: 3.8 MMOL/L (ref 3.5–5.1)
PROT SERPL-MCNC: 7.7 G/DL (ref 6.4–8.2)
RBC # BLD AUTO: 4.68 M/UL (ref 4.1–5.7)
SODIUM SERPL-SCNC: 138 MMOL/L (ref 136–145)
WBC # BLD AUTO: 8.1 K/UL (ref 4.1–11.1)

## 2022-04-08 PROCEDURE — 96374 THER/PROPH/DIAG INJ IV PUSH: CPT

## 2022-04-08 PROCEDURE — 74011000636 HC RX REV CODE- 636: Performed by: EMERGENCY MEDICINE

## 2022-04-08 PROCEDURE — 70496 CT ANGIOGRAPHY HEAD: CPT

## 2022-04-08 PROCEDURE — 74011250636 HC RX REV CODE- 250/636

## 2022-04-08 PROCEDURE — 74011250636 HC RX REV CODE- 250/636: Performed by: EMERGENCY MEDICINE

## 2022-04-08 RX ORDER — ONDANSETRON 2 MG/ML
4 INJECTION INTRAMUSCULAR; INTRAVENOUS
Status: COMPLETED | OUTPATIENT
Start: 2022-04-08 | End: 2022-04-08

## 2022-04-08 RX ORDER — LORAZEPAM 2 MG/ML
1 INJECTION INTRAMUSCULAR
Status: DISCONTINUED | OUTPATIENT
Start: 2022-04-08 | End: 2022-04-08 | Stop reason: HOSPADM

## 2022-04-08 RX ORDER — ONDANSETRON 2 MG/ML
INJECTION INTRAMUSCULAR; INTRAVENOUS
Status: COMPLETED
Start: 2022-04-08 | End: 2022-04-08

## 2022-04-08 RX ORDER — MECLIZINE HCL 12.5 MG 12.5 MG/1
50 TABLET ORAL
Status: COMPLETED | OUTPATIENT
Start: 2022-04-08 | End: 2022-04-08

## 2022-04-08 RX ORDER — MECLIZINE HYDROCHLORIDE 25 MG/1
25 TABLET ORAL
Qty: 20 TABLET | Refills: 0 | Status: SHIPPED | OUTPATIENT
Start: 2022-04-08 | End: 2022-06-13

## 2022-04-08 RX ADMIN — ONDANSETRON HYDROCHLORIDE 4 MG: 2 INJECTION, SOLUTION INTRAMUSCULAR; INTRAVENOUS at 01:02

## 2022-04-08 RX ADMIN — ONDANSETRON 4 MG: 2 INJECTION INTRAMUSCULAR; INTRAVENOUS at 01:02

## 2022-04-08 RX ADMIN — MECLIZINE 50 MG: 12.5 TABLET ORAL at 00:47

## 2022-04-08 RX ADMIN — IOPAMIDOL 100 ML: 755 INJECTION, SOLUTION INTRAVENOUS at 01:23

## 2022-04-08 NOTE — ED PROVIDER NOTES
EMERGENCY DEPARTMENT HISTORY AND PHYSICAL EXAM      Date: 4/7/2022  Patient Name: Steph Adair    History of Presenting Illness     Chief Complaint   Patient presents with    Dizziness     pt presents with c/o dizziness that started around 2015 per pt, states that everything feels like it is spinning. Pt A&Ox4, VS stable       History Provided By: Patient    HPI: Steph Adair, 43 y.o. male with PMHx as noted below presents the emergency department chief complaint of vertigo. Patient states that approximately 8 PM woke up from a nap and felt as though the room was spinning. Patient states he felt as though his equilibrium was off. Patient notes that symptoms are absent when he is not moving his head however symptoms do come on with any head movement or standing. When symptoms are present they are severe and has associated nausea. Currently at rest he is asymptomatic. He denies any visual changes, focal deficits, speech difficulty. Pt denies any other alleviating or exacerbating factors. Additionally, pt specifically denies any recent fever, chills, headache, nausea, vomiting, abdominal pain, CP, SOB,, numbness, weakness, BLE swelling, heart palpitations, urinary sxs, diarrhea, constipation, melena, hematochezia, cough, or congestion. PCP: None        Past History     Past Medical History:  History reviewed. No pertinent past medical history. Past Surgical History:  History reviewed. No pertinent surgical history. Family History:  History reviewed. No pertinent family history. Social History:  Social History     Tobacco Use    Smoking status: Former Smoker    Smokeless tobacco: Never Used   Substance Use Topics    Alcohol use: Not Currently    Drug use: Not Currently       Allergies:  No Known Allergies      Review of Systems   Review of Systems  Constitutional: Negative for fever, chills, and fatigue.    HENT: Negative for congestion, sore throat, rhinorrhea, sneezing and neck stiffness   Eyes: Negative for discharge and redness. Respiratory: Negative for  shortness of breath, wheezing   Cardiovascular: Negative for chest pain, palpitations   Gastrointestinal: Negative for nausea, vomiting, abdominal pain, constipation, diarrhea and blood in stool. Genitourinary: Negative for dysuria, hematuria, flank pain, decreased urine volume, discharge,   Musculoskeletal: Negative for myalgias or joint pain . Skin: Negative for rash or lesions . Neurological: Negative weakness, , numbness and headaches. Physical Exam   Physical Exam    GENERAL: alert and oriented, no acute distress  EYES: PEERL, No injection, discharge or icterus. ENT: Mucous membranes pink and moist.  NECK: Supple  LUNGS: Airway patent. Non-labored respirations. Breath sounds clear with good air entry bilaterally. HEART: Regular rate and rhythm. No peripheral edema  ABDOMEN: Non-distended and non-tender, without guarding or rebound. SKIN:  warm, dry  MSK/EXTREMITIES: Without swelling, tenderness or deformity, symmetric with normal ROM  NEUROLOGICAL:  alert and oriented x 3, CN II-XII grossly intact, strength 5/5 bilateral upper and lower extremities, sensation intact throughout to light touch, no dysmetria or ataxia noted. Unilateral horizontal nystagmus noted on head movement. Diagnostic Study Results     Labs -     reviewed and interpreted by me    Radiologic Studies -   CTA HEAD NECK W CONT   Final Result   1. 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 2.  Indeterminate 5 mm right apical nodule, probably infectious or inflammatory. Consider 12 month follow-up CT in high risk patients. CT Results  (Last 48 hours)    None        CXR Results  (Last 48 hours)    None            Medical Decision Making     IDeep MD am the first provider for this patient and am the attending of record for this patient encounter.     I reviewed the vital signs, available nursing notes, past medical history, past surgical history, family history and social history. Vital Signs-Reviewed the patient's vital signs. No data found. EKG interpretation: (Preliminary)  Rhythm: normal sinus rhythm; and regular . Rate (approx.): 65; Axis: normal; P wave: normal; QRS interval: normal ; ST/T wave: Nonspecific changes;  was interpreted by Abbey Machado MD,ED Provider. Records Reviewed: Nursing Notes and Old Medical Records    Provider Notes (Medical Decision Making): On presentation, the patient is well appearing, in no acute distress with normal vital signs. Based on my history and exam the differential diagnosis for this patient includes BPPV, meniers, labyrinthitis, stroke, vertebral dissection. Imaging showed no acute findings, labs reassuring. Patient given meclizine with improvement. considered posterior stroke however sx are only present on movement/position change, no abnormal findings on neuro exam and improvement with meclizine make stroke less likely, additionally does not have any significant risk factors. Patient made aware of incedental CT findings  And referred to PT. ED Course:   Initial assessment performed. The patients presenting problems have been discussed, and they are in agreement with the care plan formulated and outlined with them. I have encouraged them to ask questions as they arise throughout their visit. Medications   meclizine (ANTIVERT) tablet 50 mg (50 mg Oral Given 4/8/22 0047)   ondansetron (ZOFRAN) injection 4 mg (4 mg IntraVENous Given 4/8/22 0102)   iopamidoL (ISOVUE-370) 76 % injection 100 mL (100 mL IntraVENous Given 4/8/22 0123)         PROGRESS  Primo Lawson  results have been reviewed with him. He has been counseled regarding his diagnosis. He verbally conveys understanding and agreement of the signs, symptoms, diagnosis, treatment and prognosis and additionally agrees to follow up as recommended with Dr. None in 24 - 48 hours.   He also agrees with the care-plan and conveys that all of his questions have been answered. I have also put together some discharge instructions for him that include: 1) educational information regarding their diagnosis, 2) how to care for their diagnosis at home, as well a 3) list of reasons why they would want to return to the ED prior to their follow-up appointment, should their condition change. Disposition:  home    PLAN:  1. Discharge Medication List as of 4/8/2022  3:18 AM      START taking these medications    Details   meclizine (ANTIVERT) 25 mg tablet Take 1 Tablet by mouth three (3) times daily as needed for Dizziness., Normal, Disp-20 Tablet, R-0           2. Follow-up Information     Follow up With Specialties Details Why Contact Info    [facility] EMERGENCY DEPT Emergency Medicine  If symptoms worsen [address] [facility]  [address]   [address] [place] [address]    [address] [facility] [address]   [phone]  [phone]        Return to ED if worse     Diagnosis     Clinical Impression:   1. Vertigo        Please note that this dictation was completed with Dragon, computer voice recognition software. Quite often unanticipated grammatical, syntax, homophones, and other interpretive errors are inadvertently transcribed by the computer software. Please disregard these errors. Additionally, please excuse any errors that have escaped final proofreading.

## 2022-04-08 NOTE — ED TRIAGE NOTES
.  Chief Complaint   Patient presents with    Dizziness     pt presents with c/o dizziness that started around 2015 per pt, states that everything feels like it is spinning.  Pt A&Ox4, VS stable

## 2022-04-08 NOTE — ED NOTES
Bedside shift change report given to [address] [place] [address] (oncoming nurse) by Jayla Anand (offgoing nurse). Report included the following information SBAR and ED Summary.

## 2022-04-09 LAB
ATRIAL RATE: 65 BPM
CALCULATED P AXIS, ECG09: 66 DEGREES
CALCULATED R AXIS, ECG10: 47 DEGREES
CALCULATED T AXIS, ECG11: 64 DEGREES
DIAGNOSIS, 93000: NORMAL
P-R INTERVAL, ECG05: 184 MS
Q-T INTERVAL, ECG07: 396 MS
QRS DURATION, ECG06: 88 MS
QTC CALCULATION (BEZET), ECG08: 411 MS
VENTRICULAR RATE, ECG03: 65 BPM

## 2022-04-29 ENCOUNTER — HOSPITAL ENCOUNTER (OUTPATIENT)
Age: 37
Setting detail: OBSERVATION
Discharge: HOME | End: 2022-04-30
Attending: EMERGENCY MEDICINE | Admitting: INTERNAL MEDICINE
Payer: COMMERCIAL

## 2022-04-29 ENCOUNTER — APPOINTMENT (OUTPATIENT)
Dept: CT IMAGING | Age: 37
End: 2022-04-29
Attending: EMERGENCY MEDICINE
Payer: COMMERCIAL

## 2022-04-29 ENCOUNTER — APPOINTMENT (OUTPATIENT)
Dept: MRI IMAGING | Age: 37
End: 2022-04-29
Attending: INTERNAL MEDICINE
Payer: COMMERCIAL

## 2022-04-29 DIAGNOSIS — G43.109 COMPLICATED MIGRAINE: ICD-10-CM

## 2022-04-29 DIAGNOSIS — H81.399 PERIPHERAL VERTIGO, UNSPECIFIED LATERALITY: ICD-10-CM

## 2022-04-29 DIAGNOSIS — R51.9 INTRACTABLE HEADACHE, UNSPECIFIED CHRONICITY PATTERN, UNSPECIFIED HEADACHE TYPE: Primary | ICD-10-CM

## 2022-04-29 DIAGNOSIS — H53.8 BLURRY VISION: ICD-10-CM

## 2022-04-29 LAB
ALBUMIN SERPL-MCNC: 3.9 G/DL (ref 3.5–5)
ALBUMIN/GLOB SERPL: 1 {RATIO} (ref 1.1–2.2)
ALP SERPL-CCNC: 63 U/L (ref 45–117)
ALT SERPL-CCNC: 45 U/L (ref 12–78)
ANION GAP SERPL CALC-SCNC: 6 MMOL/L (ref 5–15)
AST SERPL-CCNC: 20 U/L (ref 15–37)
BASOPHILS # BLD: 0.1 K/UL (ref 0–0.1)
BASOPHILS NFR BLD: 1 % (ref 0–1)
BILIRUB SERPL-MCNC: 0.3 MG/DL (ref 0.2–1)
BUN SERPL-MCNC: 16 MG/DL (ref 6–20)
BUN/CREAT SERPL: 16 (ref 12–20)
CALCIUM SERPL-MCNC: 8.6 MG/DL (ref 8.5–10.1)
CHLORIDE SERPL-SCNC: 108 MMOL/L (ref 97–108)
CO2 SERPL-SCNC: 26 MMOL/L (ref 21–32)
CREAT SERPL-MCNC: 1.03 MG/DL (ref 0.7–1.3)
DIFFERENTIAL METHOD BLD: ABNORMAL
EOSINOPHIL # BLD: 0.2 K/UL (ref 0–0.4)
EOSINOPHIL NFR BLD: 3 % (ref 0–7)
ERYTHROCYTE [DISTWIDTH] IN BLOOD BY AUTOMATED COUNT: 12 % (ref 11.5–14.5)
GLOBULIN SER CALC-MCNC: 4.1 G/DL (ref 2–4)
GLUCOSE BLD STRIP.AUTO-MCNC: 123 MG/DL (ref 65–117)
GLUCOSE SERPL-MCNC: 113 MG/DL (ref 65–100)
HCT VFR BLD AUTO: 43.1 % (ref 36.6–50.3)
HGB BLD-MCNC: 14.4 G/DL (ref 12.1–17)
IMM GRANULOCYTES # BLD AUTO: 0 K/UL (ref 0–0.04)
IMM GRANULOCYTES NFR BLD AUTO: 1 % (ref 0–0.5)
INR PPP: 1.9 (ref 0.9–1.1)
LYMPHOCYTES # BLD: 2.5 K/UL (ref 0.8–3.5)
LYMPHOCYTES NFR BLD: 37 % (ref 12–49)
MCH RBC QN AUTO: 29.4 PG (ref 26–34)
MCHC RBC AUTO-ENTMCNC: 33.4 G/DL (ref 30–36.5)
MCV RBC AUTO: 88.1 FL (ref 80–99)
MONOCYTES # BLD: 0.8 K/UL (ref 0–1)
MONOCYTES NFR BLD: 12 % (ref 5–13)
NEUTS SEG # BLD: 3.1 K/UL (ref 1.8–8)
NEUTS SEG NFR BLD: 46 % (ref 32–75)
NRBC # BLD: 0 K/UL (ref 0–0.01)
NRBC BLD-RTO: 0 PER 100 WBC
PLATELET # BLD AUTO: 221 K/UL (ref 150–400)
PMV BLD AUTO: 8.3 FL (ref 8.9–12.9)
POTASSIUM SERPL-SCNC: 3.7 MMOL/L (ref 3.5–5.1)
PROT SERPL-MCNC: 8 G/DL (ref 6.4–8.2)
PROTHROMBIN TIME: 18.8 SEC (ref 9–11.1)
RBC # BLD AUTO: 4.89 M/UL (ref 4.1–5.7)
SERVICE CMNT-IMP: ABNORMAL
SODIUM SERPL-SCNC: 140 MMOL/L (ref 136–145)
WBC # BLD AUTO: 6.7 K/UL (ref 4.1–11.1)

## 2022-04-29 PROCEDURE — G0378 HOSPITAL OBSERVATION PER HR: HCPCS

## 2022-04-29 PROCEDURE — 80053 COMPREHEN METABOLIC PANEL: CPT

## 2022-04-29 PROCEDURE — 96374 THER/PROPH/DIAG INJ IV PUSH: CPT

## 2022-04-29 PROCEDURE — 93005 ELECTROCARDIOGRAM TRACING: CPT

## 2022-04-29 PROCEDURE — 96372 THER/PROPH/DIAG INJ SC/IM: CPT

## 2022-04-29 PROCEDURE — 94762 N-INVAS EAR/PLS OXIMTRY CONT: CPT

## 2022-04-29 PROCEDURE — 85610 PROTHROMBIN TIME: CPT

## 2022-04-29 PROCEDURE — 0042T CT CODE NEURO PERF W CBF: CPT

## 2022-04-29 PROCEDURE — 65270000046 HC RM TELEMETRY

## 2022-04-29 PROCEDURE — 82962 GLUCOSE BLOOD TEST: CPT

## 2022-04-29 PROCEDURE — 36415 COLL VENOUS BLD VENIPUNCTURE: CPT

## 2022-04-29 PROCEDURE — 74011250636 HC RX REV CODE- 250/636: Performed by: INTERNAL MEDICINE

## 2022-04-29 PROCEDURE — 99285 EMERGENCY DEPT VISIT HI MDM: CPT

## 2022-04-29 PROCEDURE — 85025 COMPLETE CBC W/AUTO DIFF WBC: CPT

## 2022-04-29 PROCEDURE — 70450 CT HEAD/BRAIN W/O DYE: CPT

## 2022-04-29 PROCEDURE — 74011250636 HC RX REV CODE- 250/636: Performed by: EMERGENCY MEDICINE

## 2022-04-29 PROCEDURE — 74011000636 HC RX REV CODE- 636: Performed by: EMERGENCY MEDICINE

## 2022-04-29 PROCEDURE — 70496 CT ANGIOGRAPHY HEAD: CPT

## 2022-04-29 PROCEDURE — 70551 MRI BRAIN STEM W/O DYE: CPT

## 2022-04-29 RX ORDER — ACETAMINOPHEN 325 MG/1
650 TABLET ORAL
Status: DISCONTINUED | OUTPATIENT
Start: 2022-04-29 | End: 2022-04-30 | Stop reason: HOSPADM

## 2022-04-29 RX ORDER — POLYETHYLENE GLYCOL 3350 17 G/17G
17 POWDER, FOR SOLUTION ORAL DAILY PRN
Status: DISCONTINUED | OUTPATIENT
Start: 2022-04-29 | End: 2022-04-30 | Stop reason: HOSPADM

## 2022-04-29 RX ORDER — ACETAMINOPHEN 650 MG/1
650 SUPPOSITORY RECTAL
Status: DISCONTINUED | OUTPATIENT
Start: 2022-04-29 | End: 2022-04-30 | Stop reason: HOSPADM

## 2022-04-29 RX ORDER — ENOXAPARIN SODIUM 100 MG/ML
40 INJECTION SUBCUTANEOUS EVERY 24 HOURS
Status: DISCONTINUED | OUTPATIENT
Start: 2022-04-29 | End: 2022-04-30 | Stop reason: HOSPADM

## 2022-04-29 RX ORDER — GUAIFENESIN 100 MG/5ML
81 LIQUID (ML) ORAL DAILY
Status: DISCONTINUED | OUTPATIENT
Start: 2022-04-30 | End: 2022-04-30

## 2022-04-29 RX ORDER — MECLIZINE HCL 12.5 MG 12.5 MG/1
25 TABLET ORAL
Status: DISCONTINUED | OUTPATIENT
Start: 2022-04-29 | End: 2022-04-30 | Stop reason: HOSPADM

## 2022-04-29 RX ORDER — METOCLOPRAMIDE HYDROCHLORIDE 5 MG/ML
10 INJECTION INTRAMUSCULAR; INTRAVENOUS
Status: COMPLETED | OUTPATIENT
Start: 2022-04-29 | End: 2022-04-29

## 2022-04-29 RX ORDER — ONDANSETRON 2 MG/ML
4 INJECTION INTRAMUSCULAR; INTRAVENOUS
Status: ACTIVE | OUTPATIENT
Start: 2022-04-29 | End: 2022-04-30

## 2022-04-29 RX ADMIN — ENOXAPARIN SODIUM 40 MG: 40 INJECTION SUBCUTANEOUS at 18:47

## 2022-04-29 RX ADMIN — IOPAMIDOL 100 ML: 755 INJECTION, SOLUTION INTRAVENOUS at 17:23

## 2022-04-29 RX ADMIN — METOCLOPRAMIDE 10 MG: 5 INJECTION, SOLUTION INTRAMUSCULAR; INTRAVENOUS at 17:37

## 2022-04-29 NOTE — ED NOTES
TRANSITION OF CARE - SBAR OUT    Patient is being transferred to [facility] Tele, Room# 05487 [phone]. Report GIVEN TO Evy Faust RN on Vonzell Nyhan for routine progression of care. Report is consisted of the following information SBAR. Patient transferred to receiving unit by: transport (RN or Tech Name)     Called outstanding consults: Yes   Collected routine labs: Yes     All current orders reviewed with accepting nurse: Yes    The following personal items will be sent with the patient during transfer to the floor:   All valuables:                          CARDIAC MONITORING ORDERED: Yes     The following CURRENT information were reported to the receiving RN:    CODE STATUS: Full Code    NIH SCORE: Total: 1 (04/29/22 1748)  JOE SCREENING: Swallow Screening  Is the Patient Unable to Remain Alert for Testing?: No (04/29/22 1700)  Is the Patient on a Modified Diet (Thickened Liquids) Due to Pre-existing Dysphagia?: No (04/29/22 1700)  Is There Presence of Existing Enteral Tube Feeding via the Stomach or Nose?: No (04/29/22 1700)  Is There Presence of Head-of-Bed Restrictions (Less than 30 Degrees)?: No (04/29/22 1700)  Is There Presence of Tracheotomy Tube?: No (04/29/22 1700)  Is the Patient Ordered Nothing-by-Mouth Status?: No (04/29/22 1700)  3 oz Water Swallow Screen: Pass (04/29/22 1700)    NEURO ASSESSMENT: Neuro  Neurologic State: Alert (04/29/22 1748)  Orientation Level: Oriented X4 (04/29/22 1748)  Cognition: Appropriate decision making (04/29/22 1748)  Speech: Clear (04/29/22 1748)      RESTRAINTS IN USE: No      IS DOCUMENTATION COMPLETE: Yes      Vital Signs  Level of Consciousness: Alert (0) (04/29/22 1846)  Temp: 98 °F (36.7 °C) (04/29/22 1645)  Temp Source: Oral (04/29/22 1645)  Pulse (Heart Rate): 93 (04/29/22 1846)  Heart Rate Source: Monitor (04/29/22 1846)  Resp Rate: 19 (04/29/22 1846)  BP: 111/72 (04/29/22 1846)  MAP (Monitor): 84 (04/29/22 1846)  MAP (Calculated): 85 (04/29/22 1846)  BP 1 Location: Right arm (04/29/22 1846)  BP 1 Method: Automatic (04/29/22 1846)  BP Patient Position: At rest (04/29/22 1846)  MEWS Score: 0 (04/29/22 1645)  Pain 1  Pain Scale 1: Numeric (0 - 10) (04/29/22 1645)  Pain Intensity 1: 7 (04/29/22 1645)      OXYGEN: Oxygen Therapy  O2 Device: None (Room air) (04/29/22 1645)    MISTY FALL RISK:        WOUNDS: No      URINARY CATHETER: voiding    LINE ACCESS:   Peripheral IV 04/29/22 Right Antecubital (Active)   Site Assessment Clean, dry, & intact 04/29/22 1733   Phlebitis Assessment 0 04/29/22 1733   Infiltration Assessment 0 04/29/22 1733   Dressing Status Clean, dry, & intact 04/29/22 1733   Dressing Type Transparent 04/29/22 1733   Hub Color/Line Status Pink;Flushed 04/29/22 1733   Alcohol Cap Used Yes 04/29/22 1733        Opportunity for questions and clarification were provided.   Kaila Blevins RN

## 2022-04-29 NOTE — ED PROVIDER NOTES
EMERGENCY DEPARTMENT HISTORY AND PHYSICAL EXAM      Date: 4/29/2022  Patient Name: Jaelyn Rosario    Please note that this dictation was completed with [employer], the computer voice recognition software. Quite often unanticipated grammatical, syntax, homophones, and other interpretive errors are inadvertently transcribed by the computer software. Please disregard these errors. Please excuse any errors that have escaped final proofreading. History of Presenting Illness     Chief Complaint   Patient presents with    Headache     \"I don't feel right\" two days ago blacked out twice, today has a lot of pressure on right side of head. his vision on right side is blurry ; if he covers his left eye and right eye blurry. woke up with the headache.  Blurred Vision       History Provided By: Patient     HPI: Jaelyn Rosario, 43 y.o. male, presenting the emergency department complaining of headache, blurred vision. Patient states that he has been dealing with episodes of vertigo for the last 2 weeks. 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 He states that today he developed a right-sided headache described as throbbing, he woke up with it. He rates it as 7 out of 10, nothing makes it better, nothing makes it worse. Its associated with some nausea. He states around noon he developed monocular blurred vision, but no loss of vision. No visual field cut. He does report that he has been having intermittent episodes of vertigo-like symptoms for the last week and a half or 2 weeks since he was seen here. PCP: None    No current facility-administered medications on file prior to encounter. Current Outpatient Medications on File Prior to Encounter   Medication Sig Dispense Refill    meclizine (ANTIVERT) 25 mg tablet Take 1 Tablet by mouth three (3) times daily as needed for Dizziness. 20 Tablet 0       Past History     Past Medical History:  No past medical history on file.     Past Surgical History:  No past surgical history on file. Family History:  No family history on file. Social History:  Social History     Tobacco Use    Smoking status: Former Smoker    Smokeless tobacco: Never Used   Substance Use Topics    Alcohol use: Not Currently    Drug use: Not Currently       Allergies:  No Known Allergies      Review of Systems   Review of Systems   Constitutional: Negative for chills and fever. HENT: Negative for congestion and sore throat. Eyes: Positive for visual disturbance. Respiratory: Negative for cough and shortness of breath. Cardiovascular: Negative for chest pain and leg swelling. Gastrointestinal: Positive for nausea. Negative for abdominal pain, blood in stool and diarrhea. Endocrine: Negative for polyuria. Genitourinary: Negative for dysuria and testicular pain. Musculoskeletal: Negative for arthralgias, joint swelling and myalgias. Skin: Negative for rash. Allergic/Immunologic: Negative for immunocompromised state. Neurological: Positive for headaches. Negative for weakness. Hematological: Does not bruise/bleed easily. Psychiatric/Behavioral: Negative for confusion. Physical Exam   Physical Exam  Vitals and nursing note reviewed. Constitutional:       Appearance: He is well-developed. HENT:      Head: Normocephalic and atraumatic. Eyes:      General:         Right eye: No discharge. Left eye: No discharge. Conjunctiva/sclera: Conjunctivae normal.      Pupils: Pupils are equal, round, and reactive to light. Comments: Extraocular muscle movements intact, no nystagmus, no visual field deficit. Neck:      Trachea: No tracheal deviation. Cardiovascular:      Rate and Rhythm: Normal rate and regular rhythm. Heart sounds: Normal heart sounds. No murmur heard. Pulmonary:      Effort: Pulmonary effort is normal. No respiratory distress. Breath sounds: Normal breath sounds. No wheezing or rales. Abdominal:      General: Bowel sounds are normal.      Palpations: Abdomen is soft. Tenderness: There is no abdominal tenderness. There is no guarding or rebound. Musculoskeletal:         General: No tenderness or deformity. Normal range of motion. Cervical back: Normal range of motion and neck supple. Skin:     General: Skin is warm and dry. Findings: No erythema or rash. Neurological:      Mental Status: He is alert and oriented to person, place, and time. Comments: No facial droop, no slurring speech, equal strength in the upper and lower extremities. Normal finger-nose, ambulates with a normal, steady gait   Psychiatric:         Behavior: Behavior normal.         Diagnostic Study Results     Labs -     Recent Results (from the past 12 hour(s))   GLUCOSE, POC    Collection Time: 04/29/22  4:49 PM   Result Value Ref Range    Glucose (POC) 123 (H) 65 - 117 mg/dL    Performed by Ximena Jackson \"Shelia\"    CBC WITH AUTOMATED DIFF    Collection Time: 04/29/22  4:59 PM   Result Value Ref Range    WBC 6.7 4.1 - 11.1 K/uL    RBC 4.89 4. 10 - 5.70 M/uL    HGB 14.4 12.1 - 17.0 g/dL    HCT 43.1 36.6 - 50.3 %    MCV 88.1 80.0 - 99.0 FL    MCH 29.4 26.0 - 34.0 PG    MCHC 33.4 30.0 - 36.5 g/dL    RDW 12.0 11.5 - 14.5 %    PLATELET 590 631 - 241 K/uL    MPV 8.3 (L) 8.9 - 12.9 FL    NRBC 0.0 0  WBC    ABSOLUTE NRBC 0.00 0.00 - 0.01 K/uL    NEUTROPHILS 46 32 - 75 %    LYMPHOCYTES 37 12 - 49 %    MONOCYTES 12 5 - 13 %    EOSINOPHILS 3 0 - 7 %    BASOPHILS 1 0 - 1 %    IMMATURE GRANULOCYTES 1 (H) 0.0 - 0.5 %    ABS. NEUTROPHILS 3.1 1.8 - 8.0 K/UL    ABS. LYMPHOCYTES 2.5 0.8 - 3.5 K/UL    ABS. MONOCYTES 0.8 0.0 - 1.0 K/UL    ABS. EOSINOPHILS 0.2 0.0 - 0.4 K/UL    ABS. BASOPHILS 0.1 0.0 - 0.1 K/UL    ABS. IMM.  GRANS. 0.0 0.00 - 0.04 K/UL    DF AUTOMATED     METABOLIC PANEL, COMPREHENSIVE    Collection Time: 04/29/22  4:59 PM   Result Value Ref Range    Sodium 140 136 - 145 mmol/L    Potassium 3.7 3.5 - 5.1 mmol/L    Chloride 108 97 - 108 mmol/L    CO2 26 21 - 32 mmol/L    Anion gap 6 5 - 15 mmol/L    Glucose 113 (H) 65 - 100 mg/dL    BUN 16 6 - 20 MG/DL    Creatinine 1.03 0.70 - 1.30 MG/DL    BUN/Creatinine ratio 16 12 - 20      GFR est AA >60 >60 ml/min/1.73m2    GFR est non-AA >60 >60 ml/min/1.73m2    Calcium 8.6 8.5 - 10.1 MG/DL    Bilirubin, total 0.3 0.2 - 1.0 MG/DL    ALT (SGPT) 45 12 - 78 U/L    AST (SGOT) 20 15 - 37 U/L    Alk. phosphatase 63 45 - 117 U/L    Protein, total 8.0 6.4 - 8.2 g/dL    Albumin 3.9 3.5 - 5.0 g/dL    Globulin 4.1 (H) 2.0 - 4.0 g/dL    A-G Ratio 1.0 (L) 1.1 - 2.2     PROTHROMBIN TIME + INR    Collection Time: 04/29/22  4:59 PM   Result Value Ref Range    INR 1.9 (H) 0.9 - 1.1      Prothrombin time 18.8 (H) 9.0 - 11.1 sec   EKG, 12 LEAD, INITIAL    Collection Time: 04/29/22  5:40 PM   Result Value Ref Range    Ventricular Rate 78 BPM    Atrial Rate 78 BPM    P-R Interval 160 ms    QRS Duration 84 ms    Q-T Interval 366 ms    QTC Calculation (Bezet) 417 ms    Calculated P Axis 63 degrees    Calculated R Axis 29 degrees    Calculated T Axis 52 degrees    Diagnosis       Normal sinus rhythm  Normal ECG  When compared with ECG of 07-APR-2022 23:48,  No significant change was found         Radiologic Studies -   MRI BRAIN WO CONT   Final Result   Normal study      CT CODE NEURO PERF W CBF         CTA CODE NEURO HEAD AND NECK W CONT         CT CODE NEURO HEAD WO CONTRAST   Final Result   No acute intracranial process seen        CT Results  (Last 48 hours)               04/29/22 1719  CT CODE NEURO PERF W CBF Preliminary result    Narrative:  *PRELIMINARY REPORT*       No perfusion defect       Preliminary report was provided by Dr. Aminah Louis, the on-call radiologist, at 5259   hours       Final report to follow.        *END PRELIMINARY REPORT*                               04/29/22 1719  CTA CODE NEURO HEAD AND NECK W CONT Preliminary result    Narrative:  *PRELIMINARY REPORT*       No large vessel occlusion       Preliminary report was provided by Dr. Mary Vegas, the on-call radiologist, at [phone]       Final report to follow. *END PRELIMINARY REPORT*                               04/29/22 1708  CT CODE NEURO HEAD WO CONTRAST Final result    Impression:  No acute intracranial process seen       Narrative:  EXAM: CT CODE NEURO HEAD WO CONTRAST       INDICATION: Code Stroke       COMPARISON: 4/8/2022. CONTRAST: None. TECHNIQUE: Unenhanced CT of the head was performed using 5 mm images. Brain and   bone windows were generated. Coronal and sagittal reformats. CT dose reduction   was achieved through use of a standardized protocol tailored for this   examination and automatic exposure control for dose modulation. FINDINGS:   The ventricles and sulci are normal in size, shape and configuration. . There is   no significant white matter disease. There is no intracranial hemorrhage,   extra-axial collection, or mass effect. The basilar cisterns are open. No CT   evidence of acute infarct. The bone windows demonstrate no abnormalities. The visualized portions of the   paranasal sinuses and mastoid air cells are clear. CXR Results  (Last 48 hours)    None            Medical Decision Making   I am the first provider for this patient. I reviewed the vital signs, available nursing notes, past medical history, past surgical history, family history and social history. Vital Signs-Reviewed the patient's vital signs. Patient Vitals for the past 12 hrs:   Temp Pulse Resp BP SpO2   04/29/22 1938 97.8 °F (36.6 °C) 78 16 100/65 98 %   04/29/22 1846 -- 93 19 111/72 99 %   04/29/22 1645 98 °F (36.7 °C) 91 14 119/78 100 %       EKG interpretation: (Preliminary)  EKG shows sinus rhythm, rate 78. Normal axis. Normal intervals. No evidence of acute ischemic ST-T wave changes.   Interpreted by me    Records Reviewed:   Nursing notes, Prior visits     ED visit from 4/19 reviewed, patient here for vertiginous-like symptoms. Had CTA at that time which showed a questionable 1 to 2 mm right carotid aneurysm    Provider Notes (Medical Decision Making):   Patient presents with monocular blurring vision and headache. He is not a tPA candidate given the timeframe of symptoms. Low clinical suspicion for thromboembolic stroke, does have a history of head and was recently diagnosed, will obtain CT to make sure there is no evidence of bleed, will check a CTA. Will consult telemetry neurology. Position pending laboratory work-up and imaging. Anticipate likely hospitalization for ruling out stroke. ED Course:   Initial assessment performed. The patients presenting problems have been discussed, and they are in agreement with the care plan formulated and outlined with them. I have encouraged them to ask questions as they arise throughout their visit. 4:59 PM  Discussed with teleneurology, will see and evaluate the patient. Will recommend further imaging        Critical Care Time:   none    Disposition:  Patient is being admitted to the hospital by Dr. Mitch Mccormack. The results of their tests and reasons for their admission have been discussed with them and/or available family. They convey agreement and understanding for the need to be admitted and for their admission diagnosis. Consultation has been made with the inpatient physician specialist for hospitalization. PLAN:  1. Current Discharge Medication List        2. Follow-up Information    None         Return to ED if worse     Diagnosis     Clinical Impression:   1. Intractable headache, unspecified chronicity pattern, unspecified headache type    2. Blurry vision        Attestations:   This note was completed by Librado Marques DO

## 2022-04-29 NOTE — PROGRESS NOTES
-Please complete MRI History and Safety Screening Form   - Patient cannot be scanned until this form is completed, including signatures, and reviewed in MRI to ensure patient is SAFE and eligible for MRI. - CALL MRI when this has been successfully completed at [phone].

## 2022-04-29 NOTE — ED NOTES
Pt arrives to ED room from triage with a cc of dizziness, blurry vision and headache x 1 day; pt states he was seen here recently for vertigo and out patient MRI for r/o stroke. Pt states that now he is having fullness in right side of head, and blurry vision with a worsening headache. Pt is alert and oriented x 4, resting comfortably in stretcher with side rails up and call bell within reach. Tele-neuro on screen evaluating patient at this time. Code S lvl 2 called.

## 2022-04-30 ENCOUNTER — APPOINTMENT (OUTPATIENT)
Dept: NON INVASIVE DIAGNOSTICS | Age: 37
End: 2022-04-30
Attending: INTERNAL MEDICINE
Payer: COMMERCIAL

## 2022-04-30 VITALS
DIASTOLIC BLOOD PRESSURE: 73 MMHG | TEMPERATURE: 98.2 F | HEART RATE: 98 BPM | WEIGHT: 154 LBS | HEIGHT: 67 IN | BODY MASS INDEX: 24.17 KG/M2 | RESPIRATION RATE: 18 BRPM | SYSTOLIC BLOOD PRESSURE: 122 MMHG | OXYGEN SATURATION: 99 %

## 2022-04-30 LAB
ANION GAP SERPL CALC-SCNC: 6 MMOL/L (ref 5–15)
BUN SERPL-MCNC: 14 MG/DL (ref 6–20)
BUN/CREAT SERPL: 18 (ref 12–20)
CALCIUM SERPL-MCNC: 8.6 MG/DL (ref 8.5–10.1)
CHLORIDE SERPL-SCNC: 110 MMOL/L (ref 97–108)
CHOLEST SERPL-MCNC: 251 MG/DL
CO2 SERPL-SCNC: 24 MMOL/L (ref 21–32)
CREAT SERPL-MCNC: 0.76 MG/DL (ref 0.7–1.3)
ERYTHROCYTE [DISTWIDTH] IN BLOOD BY AUTOMATED COUNT: 12 % (ref 11.5–14.5)
EST. AVERAGE GLUCOSE BLD GHB EST-MCNC: 114 MG/DL
GLUCOSE SERPL-MCNC: 101 MG/DL (ref 65–100)
HBA1C MFR BLD: 5.6 % (ref 4–5.6)
HCT VFR BLD AUTO: 41.5 % (ref 36.6–50.3)
HDLC SERPL-MCNC: 45 MG/DL
HDLC SERPL: 5.6 {RATIO} (ref 0–5)
HGB BLD-MCNC: 13.9 G/DL (ref 12.1–17)
LDLC SERPL CALC-MCNC: 141.6 MG/DL (ref 0–100)
MCH RBC QN AUTO: 29.5 PG (ref 26–34)
MCHC RBC AUTO-ENTMCNC: 33.5 G/DL (ref 30–36.5)
MCV RBC AUTO: 88.1 FL (ref 80–99)
NRBC # BLD: 0 K/UL (ref 0–0.01)
NRBC BLD-RTO: 0 PER 100 WBC
PLATELET # BLD AUTO: 212 K/UL (ref 150–400)
PMV BLD AUTO: 8.2 FL (ref 8.9–12.9)
POTASSIUM SERPL-SCNC: 3.8 MMOL/L (ref 3.5–5.1)
RBC # BLD AUTO: 4.71 M/UL (ref 4.1–5.7)
SODIUM SERPL-SCNC: 140 MMOL/L (ref 136–145)
TRIGL SERPL-MCNC: 322 MG/DL (ref ?–150)
TSH SERPL DL<=0.05 MIU/L-ACNC: 2.15 UIU/ML (ref 0.36–3.74)
VLDLC SERPL CALC-MCNC: 64.4 MG/DL
WBC # BLD AUTO: 6 K/UL (ref 4.1–11.1)

## 2022-04-30 PROCEDURE — 97116 GAIT TRAINING THERAPY: CPT | Performed by: PHYSICAL THERAPIST

## 2022-04-30 PROCEDURE — 74011250636 HC RX REV CODE- 250/636: Performed by: NURSE PRACTITIONER

## 2022-04-30 PROCEDURE — 96375 TX/PRO/DX INJ NEW DRUG ADDON: CPT

## 2022-04-30 PROCEDURE — 83036 HEMOGLOBIN GLYCOSYLATED A1C: CPT

## 2022-04-30 PROCEDURE — 97535 SELF CARE MNGMENT TRAINING: CPT

## 2022-04-30 PROCEDURE — 97161 PT EVAL LOW COMPLEX 20 MIN: CPT | Performed by: PHYSICAL THERAPIST

## 2022-04-30 PROCEDURE — 80048 BASIC METABOLIC PNL TOTAL CA: CPT

## 2022-04-30 PROCEDURE — G0378 HOSPITAL OBSERVATION PER HR: HCPCS

## 2022-04-30 PROCEDURE — 80061 LIPID PANEL: CPT

## 2022-04-30 PROCEDURE — 74011250636 HC RX REV CODE- 250/636: Performed by: INTERNAL MEDICINE

## 2022-04-30 PROCEDURE — 99203 OFFICE O/P NEW LOW 30 MIN: CPT | Performed by: PSYCHIATRY & NEUROLOGY

## 2022-04-30 PROCEDURE — 97165 OT EVAL LOW COMPLEX 30 MIN: CPT

## 2022-04-30 PROCEDURE — 97530 THERAPEUTIC ACTIVITIES: CPT

## 2022-04-30 PROCEDURE — 36415 COLL VENOUS BLD VENIPUNCTURE: CPT

## 2022-04-30 PROCEDURE — 84443 ASSAY THYROID STIM HORMONE: CPT

## 2022-04-30 PROCEDURE — 97530 THERAPEUTIC ACTIVITIES: CPT | Performed by: PHYSICAL THERAPIST

## 2022-04-30 PROCEDURE — 74011250637 HC RX REV CODE- 250/637: Performed by: INTERNAL MEDICINE

## 2022-04-30 PROCEDURE — 85027 COMPLETE CBC AUTOMATED: CPT

## 2022-04-30 RX ORDER — KETOROLAC TROMETHAMINE 30 MG/ML
30 INJECTION, SOLUTION INTRAMUSCULAR; INTRAVENOUS
Status: COMPLETED | OUTPATIENT
Start: 2022-04-30 | End: 2022-04-30

## 2022-04-30 RX ORDER — MAGNESIUM SULFATE HEPTAHYDRATE 40 MG/ML
2 INJECTION, SOLUTION INTRAVENOUS ONCE
Status: COMPLETED | OUTPATIENT
Start: 2022-04-30 | End: 2022-04-30

## 2022-04-30 RX ORDER — ONDANSETRON 2 MG/ML
4 INJECTION INTRAMUSCULAR; INTRAVENOUS ONCE
Status: COMPLETED | OUTPATIENT
Start: 2022-04-30 | End: 2022-04-30

## 2022-04-30 RX ORDER — DEXAMETHASONE SODIUM PHOSPHATE 100 MG/10ML
10 INJECTION INTRAMUSCULAR; INTRAVENOUS ONCE
Status: COMPLETED | OUTPATIENT
Start: 2022-04-30 | End: 2022-04-30

## 2022-04-30 RX ADMIN — ONDANSETRON 4 MG: 2 INJECTION INTRAMUSCULAR; INTRAVENOUS at 11:45

## 2022-04-30 RX ADMIN — PHYTONADIONE 5 MG: 10 INJECTION, EMULSION INTRAMUSCULAR; INTRAVENOUS; SUBCUTANEOUS at 11:48

## 2022-04-30 RX ADMIN — ASPIRIN 81 MG: 81 TABLET, CHEWABLE ORAL at 10:58

## 2022-04-30 RX ADMIN — MAGNESIUM SULFATE HEPTAHYDRATE 2 G: 40 INJECTION, SOLUTION INTRAVENOUS at 11:55

## 2022-04-30 RX ADMIN — KETOROLAC TROMETHAMINE 30 MG: 30 INJECTION, SOLUTION INTRAMUSCULAR; INTRAVENOUS at 11:46

## 2022-04-30 RX ADMIN — DEXAMETHASONE SODIUM PHOSPHATE 10 MG: 10 INJECTION, SOLUTION INTRAMUSCULAR; INTRAVENOUS at 11:43

## 2022-04-30 NOTE — CONSULTS
[place]: [id number] [address] Neurology  [facility]  [phone]      Name:   Rainer Cramer record #: [id number]  Admission Date: 4/29/2022     Who Consulted: Dr. Padilla Caba    Reason for Consult:  Eval and treat TIA/CVA    HISTORY OF PRESENT ILLNESS:     This is a 43 y.o. male who is admitted for dizziness and blurred vision, rule out stroke. Mr. Emmie Vera presented to the ED with a new throbbing right sided headache and two week history of blurred vision. He says that was at work and developed a brief period in which he had pain pressure on the right side of his face accompanied by nausea and blurred vision in the LLQ of his right eye. 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 He does report that he has been having intermittent episodes of vertigo-like symptoms for the last week and a half or 2 weeks since he was seen here in which he feels like his vision suddenly goes black for a few minutes and he feels like he may pass out. The Neurology Service is asked to evaluate for stroke. Neuro-imaging:     CT Head: No acute intracranial process seen    CTA/CTP Head and Neck: Awaiting final read, prelim without LVO    EKG: normal sinus rhythm. Care Plan discussed with:  Patient x   Family    RN    Care Manager    Consultant/Specialist:         Thank you for allowing the Neurology Service the pleasure of participating in the care of your patient. This patient will be discussed with my collaborating care team physician,  Dr. Joi Pimentel and he may have further recommendations regarding this patient's care      Sommer Davis, [facility]  ====================      Attending Attestation:     I have reviewed the documentation provided by the nurse practitioner, Mrs. Evangelista Lyn, and we have discussed her findings and the clinical impression.  I have formulated with her the proposed management plans for this patient. Additionally,  I have personally evaluated the patient to verify the history and to confirm physical findings. Below are my additional comments:    Patient is a 33-year-old right-handed male with history of psoriatic arthritis and is on Humira who was admitted from the ER 4/29/2022 for right-sided headache, vertigo, nausea and blurry vision. Patient for the past 2 days has been having right-sided throbbing headache that is 7/10 in intensity associated with pressure pain right side of the face, nausea, vertigo and blurred vision mainly of just the right eye. Issues with blurred vision has been ongoing for the past 2 weeks as well as issues with vertigo. Patient was prescribed meclizine with no clear benefit. Review of records revealed: Patient was seen at the ER 4/7/2022 complaining of dizziness and spinning sensation. 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 No significant flow-limiting stenosis or occlusion. No ICA stenosis. In the ER, blood pressure was 119/78. Laboratory work-up revealed unremarkable CBC, TSH and CMP. Elevated INR at 1.9. Head CT without contrast did not reveal any acute process. Head and neck CTA with contrast did not reveal any large vessel occlusion or flow-limiting stenosis. No clear aneurysm seen. No perfusion defect. Brain MRI without contrast was essentially normal.    When seen, patient reports no recurrence of his headaches since being hospitalized. Reports vision of the right eye is improving. Asking if his issues could be side effect of Humira. PHYSICAL EXAM:    NEUROLOGICAL EXAM:    Appearance: The patient is well developed, well nourished, provides a coherent history and is in no acute distress. Mental Status: Oriented to time, place and person. Fluent, no aphasia or dysarthria. Mood and affect appropriate. Cranial Nerves:   Intact visual fields.  VA 20/30 OD and 20/25 OS on near vision with correction. Fundi are benign. Sharp disc margins. No papilledema. ERICKA, EOM's full, no nystagmus, no ptosis. Facial sensation is normal. Corneal reflexes are intact. Facial movement is symmetric. Hearing is normal bilaterally. Palate is midline with normal elevation. Sternocleidomastoid and trapezius muscles are normal. Tongue is midline. Motor:  5/5 strength in upper and lower proximal and distal muscles. Normal bulk and tone. No fasciculations. No pronator drift. Reflexes:   Deep tendon reflexes 2+/4 and symmetrical. Downgoing toes. Sensory:   Normal to cold. Gait:  Steady gait. No Romberg. Mild truncal instability. Tremor:   No tremor noted. Cerebellar:  Intact FTN/ARLEN/HTS. A/P Complex migraine, peripheral vertigo  Neurological examination is nonfocal.  No visual field defect. Minor abnormality on visual acuity. Funduscopic examination did not reveal any papilledema. Exam does show some truncal instability. Consider complex migraine headaches as well as peripheral vertigo. Head CT without contrast did not reveal any acute process. Brain MRI was essentially normal.    Head and neck CTA did not reveal any flow-limiting stenosis or aneurysm. No ICA stenosis. Agree with symptomatic treatment of his headaches as outlined per discussion with nurse practitioner (Decadron, admission, Compazine and Toradol). Patient given information of Dr. Chema Marley (ENT-dizziness specialist) for further evaluation if condition persist.    Agree with vestibular rehab as recommended by therapist.    Certainly issue could be side effect of Humira. No further recommendations from a neurological standpoint. Patient is okay to be discharged. Thank you for the consult. Assessment/Plan:     1.   Complex Migraine:    · Neurochecks:  Every 4 hours  · Migraine cocktail now (decadron, Mag, Compazine and toradol), can go home if vision improves  · May need outpt autonomic testing    Stroke work up  · MRI:  No acute process         Review of Systems: 10 point ROS was performed. Pertinent positives listed in HPI. Negative ROS is as follows. Pt denies: angina, palpitations, paresthesias, weakness,  slurred speech, aphasia, confusion, fever, chills, falls,  back pain, neck pain, prior episodes of vertigo, hallucinations, new medications or dosage changes. NEUROLOGICAL EXAM:     General:   Mental Status: Alert, no acute distress  Oriented to time, place and person. Fully attentive. No aphasia. Full fund of knowledge. Normal recent and remote memory. Cranial Nerves:   Visual Fields:  normal in all quadrants in both eyes. EOM: no nystagmus. Facial movements:  symmetric, no ptosis Facial sensation:  intact to LT on both sides. Hearing:  normal.       Language:  no dysarthria, no aphasia, normal fluency, normal repetition. Tongue: midline. Soft palate: not examined  SCMs: normal, symmetric. Reflexes:   LUExt: 2+/ 4                 RUExt: 2+/ 4  LLExt: 2+/4                  RLExt: 2+/ 4          Sensory:   LT and Temp intact in all extremities            Cerebellar:  No resting, no postural tremors, normal finger nose finger. No pronator drift                            Motor:           LUExt: 5/ 5               RUExt: 5/ 5                                              LLExt: 5/ 5                RLExt: 5/ 5        Gait:   Not tested              Allergies:   No Known Allergies    Outpatient Meds  No current facility-administered medications on file prior to encounter. Current Outpatient Medications on File Prior to Encounter   Medication Sig Dispense Refill    meclizine (ANTIVERT) 25 mg tablet Take 1 Tablet by mouth three (3) times daily as needed for Dizziness.  (Patient not taking: Reported on 4/30/2022) 20 Tablet 0       Inpatient Meds    Current Facility-Administered Medications   Medication Dose Route Frequency Provider Last Rate Last Admin    meclizine (ANTIVERT) tablet 25 mg  25 mg Oral TID PRN Mercedez Elias MD        acetaminophen (TYLENOL) tablet 650 mg  650 mg Oral Q4H PRN Mercedez Elias MD        Or   Mp Layer acetaminophen (TYLENOL) solution 650 mg  650 mg Per NG tube Q4H PRN Mercedez Elias MD        Or   Mp Layer acetaminophen (TYLENOL) suppository 650 mg  650 mg Rectal Q4H PRN Mercedez Elias MD        aspirin chewable tablet 81 mg  81 mg Oral DAILY Mushtaq Reed MD        polyethylene glycol (MIRALAX) packet 17 g  17 g Oral DAILY PRN Mercedez Elias MD        enoxaparin (LOVENOX) injection 40 mg  40 mg SubCUTAneous Q24H Mercedez Elias MD   40 mg at 04/29/22 1847           No past medical history on file. No past surgical history on file. family history is not on file. reports that he has quit smoking. He has never used smokeless tobacco. He reports previous alcohol use. He reports previous drug use. Lab Results (last 24 hrs)  Recent Results (from the past 24 hour(s))   GLUCOSE, POC    Collection Time: 04/29/22  4:49 PM   Result Value Ref Range    Glucose (POC) 123 (H) 65 - 117 mg/dL    Performed by Marilynn Ahumada \"Shelia\"    CBC WITH AUTOMATED DIFF    Collection Time: 04/29/22  4:59 PM   Result Value Ref Range    WBC 6.7 4.1 - 11.1 K/uL    RBC 4.89 4. 10 - 5.70 M/uL    HGB 14.4 12.1 - 17.0 g/dL    HCT 43.1 36.6 - 50.3 %    MCV 88.1 80.0 - 99.0 FL    MCH 29.4 26.0 - 34.0 PG    MCHC 33.4 30.0 - 36.5 g/dL    RDW 12.0 11.5 - 14.5 %    PLATELET 183 999 - 381 K/uL    MPV 8.3 (L) 8.9 - 12.9 FL    NRBC 0.0 0  WBC    ABSOLUTE NRBC 0.00 0.00 - 0.01 K/uL    NEUTROPHILS 46 32 - 75 %    LYMPHOCYTES 37 12 - 49 %    MONOCYTES 12 5 - 13 %    EOSINOPHILS 3 0 - 7 %    BASOPHILS 1 0 - 1 %    IMMATURE GRANULOCYTES 1 (H) 0.0 - 0.5 %    ABS. NEUTROPHILS 3.1 1.8 - 8.0 K/UL    ABS. LYMPHOCYTES 2.5 0.8 - 3.5 K/UL    ABS. MONOCYTES 0.8 0.0 - 1.0 K/UL    ABS. EOSINOPHILS 0.2 0.0 - 0.4 K/UL    ABS. BASOPHILS 0.1 0.0 - 0.1 K/UL    ABS. IMM.  GRANS. 0.0 0.00 - 0.04 K/UL    DF AUTOMATED METABOLIC PANEL, COMPREHENSIVE    Collection Time: 04/29/22  4:59 PM   Result Value Ref Range    Sodium 140 136 - 145 mmol/L    Potassium 3.7 3.5 - 5.1 mmol/L    Chloride 108 97 - 108 mmol/L    CO2 26 21 - 32 mmol/L    Anion gap 6 5 - 15 mmol/L    Glucose 113 (H) 65 - 100 mg/dL    BUN 16 6 - 20 MG/DL    Creatinine 1.03 0.70 - 1.30 MG/DL    BUN/Creatinine ratio 16 12 - 20      GFR est AA >60 >60 ml/min/1.73m2    GFR est non-AA >60 >60 ml/min/1.73m2    Calcium 8.6 8.5 - 10.1 MG/DL    Bilirubin, total 0.3 0.2 - 1.0 MG/DL    ALT (SGPT) 45 12 - 78 U/L    AST (SGOT) 20 15 - 37 U/L    Alk.  phosphatase 63 45 - 117 U/L    Protein, total 8.0 6.4 - 8.2 g/dL    Albumin 3.9 3.5 - 5.0 g/dL    Globulin 4.1 (H) 2.0 - 4.0 g/dL    A-G Ratio 1.0 (L) 1.1 - 2.2     PROTHROMBIN TIME + INR    Collection Time: 04/29/22  4:59 PM   Result Value Ref Range    INR 1.9 (H) 0.9 - 1.1      Prothrombin time 18.8 (H) 9.0 - 11.1 sec   EKG, 12 LEAD, INITIAL    Collection Time: 04/29/22  5:40 PM   Result Value Ref Range    Ventricular Rate 78 BPM    Atrial Rate 78 BPM    P-R Interval 160 ms    QRS Duration 84 ms    Q-T Interval 366 ms    QTC Calculation (Bezet) 417 ms    Calculated P Axis 63 degrees    Calculated R Axis 29 degrees    Calculated T Axis 52 degrees    Diagnosis       Normal sinus rhythm  Normal ECG  When compared with ECG of 07-APR-2022 23:48,  No significant change was found     CBC W/O DIFF    Collection Time: 04/30/22  4:23 AM   Result Value Ref Range    WBC 6.0 4.1 - 11.1 K/uL    RBC 4.71 4.10 - 5.70 M/uL    HGB 13.9 12.1 - 17.0 g/dL    HCT 41.5 36.6 - 50.3 %    MCV 88.1 80.0 - 99.0 FL    MCH 29.5 26.0 - 34.0 PG    MCHC 33.5 30.0 - 36.5 g/dL    RDW 12.0 11.5 - 14.5 %    PLATELET 939 793 - 469 K/uL    MPV 8.2 (L) 8.9 - 12.9 FL    NRBC 0.0 0  WBC    ABSOLUTE NRBC 0.00 0.00 - 9.07 K/uL   METABOLIC PANEL, BASIC    Collection Time: 04/30/22  4:23 AM   Result Value Ref Range    Sodium 140 136 - 145 mmol/L    Potassium 3.8 3.5 - 5.1 mmol/L    Chloride 110 (H) 97 - 108 mmol/L    CO2 24 21 - 32 mmol/L    Anion gap 6 5 - 15 mmol/L    Glucose 101 (H) 65 - 100 mg/dL    BUN 14 6 - 20 MG/DL    Creatinine 0.76 0.70 - 1.30 MG/DL    BUN/Creatinine ratio 18 12 - 20      GFR est AA >60 >60 ml/min/1.73m2    GFR est non-AA >60 >60 ml/min/1.73m2    Calcium 8.6 8.5 - 10.1 MG/DL   TSH 3RD GENERATION    Collection Time: 04/30/22  4:23 AM   Result Value Ref Range    TSH 2.15 0.36 - 3.74 uIU/mL

## 2022-04-30 NOTE — PROGRESS NOTES
OCCUPATIONAL THERAPY EVALUATION/DISCHARGE  Patient: Bonny Roth (40 y.o. male)  Date: 4/30/2022  Primary Diagnosis: Headache [R51.9]  Blurry vision [H53.8]       Precautions:        ASSESSMENT  Based on the objective data described below, the patient presents with blurry vision in right eye and noted to have small upward beat of nystagmus in all quadrants of tracking in both eyes. .  Pt. Was stating that when he looks up and back down then his vision is worse and he will have dizziness at times. Right UE is weaker than left and pts is right hand dominant. His  on right is weaker also, and he stated he has some pain in right shoulder with performing shoulder flexion/abduction at full range. He was able to  item from floor, walked in room with no AD, and no assist, and at this time is able to be up ad mike in room. He scored 64/66 on Rufina Bloom, but he is functional with all task. Pt does state that he has pain in his back with twisting and this is not new. Recommend that pt have vestibular rehab at discharge. Current Level of Function (ADLs/self-care): setup in this setting    Functional Outcome Measure: The patient scored 64/66 on the Rufina Bloom outcome measure which is indicative of mild . PLAN :  Recommend with staff: OOB and up ad mike in room     Recommendation for discharge: (in order for the patient to meet his/her long term goals)  No skilled occupational therapy/ follow up rehabilitation needs identified at this time. This discharge recommendation:  Has been made in collaboration with the attending provider and/or case management    IF patient discharges home will need the following DME: none       SUBJECTIVE:   Patient stated I have blurry vision in my right eye but dont feel dizzy.     OBJECTIVE DATA SUMMARY:   HISTORY:   No past medical history on file. No past surgical history on file.     Prior Level of Function/Environment/Context: [facility] and was indepednet with ADLs and ILS and working. Expanded or extensive additional review of patient history:   Home Situation  Home Environment: Private residence  # Steps to Enter: 3 (back)  Rails to Enter: Yes  Hand Rails : Left  One/Two Story Residence: Two story  # of Interior Steps:  (n/a)  Living Alone: No  Support Systems: Spouse/Significant Other,Child(shaylee)  Patient Expects to be Discharged to[de-identified] Home  Current DME Used/Available at Home: None  Tub or Shower Type: Tub/Shower combination    Hand dominance: Right    EXAMINATION OF PERFORMANCE DEFICITS:  Cognitive/Behavioral Status:  Neurologic State: Alert  Orientation Level: Oriented X4  Cognition: Follows commands  Perception: Appears intact  Perseveration: No perseveration noted  Safety/Judgement: Fall prevention    Skin: in fair health     Edema: none     Hearing: Auditory  Auditory Impairment: None    Vision/Perceptual:    Tracking:  (nystagmus at end range of tracking)                 Diplopia: No              Range of Motion:    AROM: Generally decreased, functional (R UE and LE; L UE and LE WFL  Simultaneous filing. User may not have seen previous data.)  PROM: Within functional limits                      Strength:    Strength: Generally decreased, functional (R UE and LE; L UE and LE WFL  Simultaneous filing. User may not have seen previous data.)                Coordination:  Coordination: Within functional limits (Simultaneous filing. User may not have seen previous data.)  Fine Motor Skills-Upper: Left Intact; Right Intact    Gross Motor Skills-Upper: Left Intact; Right Intact    Tone & Sensation:  Tone: Normal (Simultaneous filing. User may not have seen previous data.)  Sensation: Intact (Simultaneous filing.  User may not have seen previous data.)                      Balance:  Sitting: Intact  Standing: Intact    Functional Mobility and Transfers for ADLs:  Bed Mobility:  Rolling: Independent  Supine to Sit: Independent  Scooting: Independent    Transfers:  Sit to Stand: Independent  Stand to Sit: Independent  Bed to Chair: Independent  Bathroom Mobility: Independent  Toilet Transfer : Independent    ADL Assessment:  Feeding: Independent    Oral Facial Hygiene/Grooming: Independent    Bathing: Setup (in this setting)    Upper Body Dressing: Independent    Lower Body Dressing: Independent          Cognitive Retraining  Safety/Judgement: Fall prevention         Functional Measure:  Fugl-Hdz Assessment of Motor Recovery after Stroke:     Reflex Activity  Flexors/Biceps/Fingers: Can be elicited  Extensors/Triceps: Can be elicited  Reflex Subtotal: 4    Volitional Movement Within Synergies  Shoulder Retraction: Full  Shoulder Elevation: Full (pain in shoulder)  Shoulder Abduction (90 degrees): Full  Shoulder External Rotation: Full  Elbow Flexion: Full  Forearm Supination: Full  Shoulder Adduction/Internal Rotation: Full  Elbow Extension: Full  Forearm Pronation: Full  Subtotal: 18    Volitional Movement Mixing Synergies  Hand to Lumbar Spine: Full  Shoulder Flexion (0-90 degrees): Full  Pronation-Supination: Full  Subtotal: 6    Volitional Movement With Little or No Synergy  Shoulder Abduction (0-90 degrees): Full  Shoulder Flexion ( degrees): Partial (pain in [place])  Pronation/Supination: Full  Subtotal : 5    Normal Reflex Activity  Biceps, Triceps, Finger Flexors:  Full  Subtotal : 2    Upper Extremity Total   Upper Extremity Total: 35    Wrist  Stability at 15 Degree Dorsiflexion: Full  Repeated Dorsiflexion/ Volar Flexion: Full  Stability at 15 Degree Dorsiflexion: Full  Repeated Dorsiflexion/ Volar Flexion: Full  Circumduction: Full  Wrist Total: 10    Hand  Mass Flexion: Full  Mass Extension: Full  Grasp A: Full  Grasp B: Full  Grasp C: Full  Grasp D: Full  Grasp E: Full  Hand Total: 14    Coordination/Speed  Tremor: None  Dysmetria: None  Time: 2-5s  Coordination/Speed Total : 5    Total A-D  Total A-D (Motor Function): 64/66         This is a reliable/valid measure of arm function after a neurological event. It has established value to characterize functional status and for measuring spontaneous and therapy-induced recovery; tests proximal and distal motor functions. Fugl-Hdz Assessment - UE scores recorded between five and 30 days post neurologic event can be used to predict UE recovery at six months post neurologic event. Severe = 0-21 points   Moderately Severe = 22-33 points   Moderate = 34-47 points   Mild = 48-66 points  CHAITANYA Ivy, SANDRA Pichardo, & IRA Paniagua (1992). Measurement of motor recovery after stroke: Outcome assessment and sample size requirements.  Stroke, 23, pp. 9647-7793.   --------------------------------------------------------------------------------------------------------------------------------------------------------------------  MCID:  Stroke:   Nettie Day et al, 2001; n = 171; mean age 79 (6) years; assessed within 16 (12) days of stroke, Acute Stroke)  FMA Motor Scores from Admission to Discharge   10 point increase in FMA Upper Extremity = 1.5 change in discharge FIM   10 point increase in FMA Lower Extremity = 1.9 change in discharge FIM  MDC:   Stroke:   Rani Parsons et al, 2008, n = 14, mean age = 59.9 (14.6) years, assessed on average 14 (6.5) months post stroke, Chronic Stroke)   FMA = 5.2 points for the Upper Extremity portion of the assessment         Occupational Therapy Evaluation Charge Determination   History Examination Decision-Making   LOW Complexity : Brief history review  LOW Complexity : 1-3 performance deficits relating to physical, cognitive , or psychosocial skils that result in activity limitations and / or participation restrictions  LOW Complexity : No comorbidities that affect functional and no verbal or physical assistance needed to complete eval tasks       Based on the above components, the patient evaluation is determined to be of the following complexity level: LOW   Pain Rating:  Pain in back but did not rate    Activity Tolerance:   Fair    After treatment patient left in no apparent distress:    Sitting in chair, Call bell within reach and Caregiver / family present    COMMUNICATION/EDUCATION:   The patients plan of care was discussed with: Physical therapist and Registered nurse.      Thank you for this referral.  Sandy Hudson OT  Time Calculation: 27 mins

## 2022-04-30 NOTE — PROGRESS NOTES
Problem: Pain  Goal: *Control of Pain  Outcome: Progressing Towards Goal     Problem: TIA/CVA Stroke: 0-24 hours  Goal: Activity/Safety  Outcome: Progressing Towards Goal  Goal: Consults, if ordered  Outcome: Progressing Towards Goal  Goal: Diagnostic Test/Procedures  Outcome: Progressing Towards Goal  Goal: Nutrition/Diet  Outcome: Progressing Towards Goal  Goal: Discharge Planning  Outcome: Progressing Towards Goal  Goal: Medications  Outcome: Progressing Towards Goal  Goal: Respiratory  Outcome: Progressing Towards Goal  Goal: Treatments/Interventions/Procedures  Outcome: Progressing Towards Goal  Goal: Minimize risk of bleeding post-thrombolytic infusion  Outcome: Progressing Towards Goal  Goal: Monitor for complications post-thrombolytic infusion  Outcome: Progressing Towards Goal  Goal: Psychosocial  Outcome: Progressing Towards Goal  Goal: *Hemodynamically stable  Outcome: Progressing Towards Goal  Goal: *Neurologically stable  Description: Absence of additional neurological deficits    Outcome: Progressing Towards Goal  Goal: *Verbalizes anxiety and depression are reduced or absent  Outcome: Progressing Towards Goal  Goal: *Absence of Signs of Aspiration on Current Diet  Outcome: Progressing Towards Goal  Goal: *Absence of deep venous thrombosis signs and symptoms(Stroke Metric)  Outcome: Progressing Towards Goal  Goal: *Ability to perform ADLs and demonstrates progressive mobility and function  Outcome: Progressing Towards Goal  Goal: *Stroke education started(Stroke Metric)  Outcome: Progressing Towards Goal  Goal: *Dysphagia screen performed(Stroke Metric)  Outcome: Progressing Towards Goal  Goal: *Rehab consulted(Stroke Metric)  Outcome: Progressing Towards Goal     Problem: Falls - Risk of  Goal: *Absence of Falls  Description: Document Shakeel Fall Risk and appropriate interventions in the flowsheet.   Outcome: Progressing Towards Goal  Note: Fall Risk Interventions:  Mobility Interventions: Patient to call before getting OOB

## 2022-04-30 NOTE — PROGRESS NOTES
PHYSICAL THERAPY EVALUATION/DISCHARGE  Patient: Waleska Martinez (62 y.o. male)  Date: 4/30/2022  Primary Diagnosis: Headache [R51.9]  Blurry vision [H53.8]      ASSESSMENT  Based on the objective data described below, the patient presents with mild weakness in R UE and LE but does not impair overall function, He also demonstrates one beat of nystagmus when tracking laterally to either side and rapid beats of nystagmus in R eye when tracking upward with twitching of eyelid also present. Patient demonstrates independence with overall functional mobility and balance is intact except when looking upward when walking- patient noted to have mild balance check and complaints of mild dizziness. No further acute care PT identified at this time, but patient would benefit from OP vestibular therapy following discharge. Functional Outcome Measure: The patient scored 51/56 on the Berry balance outcome measure which is indicative of low fall risk. Further skilled acute physical therapy is not indicated at this time. PLAN :  Recommendation for discharge: (in order for the patient to meet his/her long term goals)  Outpatient physical therapy follow up recommended for vestibular rehab    This discharge recommendation:  Has not yet been discussed the attending provider and/or case management    IF patient discharges home will need the following DME: none       SUBJECTIVE:   Patient stated When I look up that is when I get dizzy especially if my eyes are open.     OBJECTIVE DATA SUMMARY:   HISTORY:    No past medical history on file. No past surgical history on file. Prior level of function: patient reports complete independence; he has recently been diagnoses with vertigo  Personal factors and/or comorbidities impacting plan of care: recent diagnosis of 1 to 2 mm cerebral aneurysm.     Home Situation  Home Environment: Private residence  # Steps to Enter: 3 (back)  Rails to Enter: Yes  Hand Rails : Left  One/Two Story Residence: Two story  # of Interior Steps:  (n/a)  Living Alone: No  Support Systems: Spouse/Significant Other,Child(shaylee)  Patient Expects to be Discharged to[de-identified] Home  Current DME Used/Available at Home: None  Tub or Shower Type: Tub/Shower combination    EXAMINATION/PRESENTATION/DECISION MAKING:   Critical Behavior:  Neurologic State: Alert  Orientation Level: Oriented X4  Cognition: Follows commands  Safety/Judgement: Fall prevention  Hearing: Auditory  Auditory Impairment: None    Range Of Motion:  AROM: Generally decreased, functional (R UE and LE; L UE and LE WFL. )           PROM: Within functional limits           Strength:    Strength: Generally decreased, functional (R UE and LE; L UE and LE WFL. )                    Tone & Sensation:   Tone: Normal (Simultaneous filing.  User may not have seen previous data.)              Sensation: Intact)               Coordination:  Coordination: Within functional limits   Vision:   Tracking:  (nystagmus at end range of tracking) rapid nystagmus noted in R eye when tracking upward  Diplopia: No  Functional Mobility:  Bed Mobility:  Rolling: Independent  Supine to Sit: Independent     Scooting: Independent  Transfers:  Sit to Stand: Independent  Stand to Sit: Independent        Bed to Chair: Independent              Balance:   Sitting: Intact  Standing: Intact  Ambulation/Gait Training:  Distance (ft): 150 Feet (ft)  Assistive Device: Gait belt  Ambulation - Level of Assistance: Supervision        Gait Abnormalities: Path deviations (mild; diizziness when looking up)        Base of Support: Widened        Step Length: Left shortened;Right shortened (minimally)         Gait is steady overall; mild balance check noted when visually tracking upward during ambulation- patient stopped and regained balance independently        Stairs:  Number of Stairs Trained: 12  Stairs - Level of Assistance: Supervision   Rail Use: None (ascending; R when descending stairs)        Functional Measure:  Berry Balance Test:    Sitting to Standin  Standing Unsupported: 4  Sitting with Back Unsupported: 4  Standing to Sittin  Transfers: 4  Standing Unsupported with Eyes Closed: 4  Standing Unsupported with Feet Together: 4  Reach Forward with Outstretched Arm: 4   Object: 4  Turn to Look Over Shoulders: 4  Turn 360 Degrees: 4  Alternate Foot on Step/Stool: 4  Standing Unsupported One Foot in Front: 1  Stand on One Le  Total: 51/56         56=Maximum possible score;   0-20=High fall risk  21-40=Moderate fall risk   41-56=Low fall risk             Activity Tolerance:   Good      After treatment patient left in no apparent distress:   Sitting in chair, Call bell within reach and Caregiver / family present    COMMUNICATION/EDUCATION:   The patients plan of care was discussed with: Occupational therapist and Registered nurse. Fall prevention education was provided and the patient/caregiver indicated understanding., Patient/family have participated as able in goal setting and plan of care. and Patient/family agree to work toward stated goals and plan of care.     Thank you for this referral.  Tiffanie Mckeon, PT   Time Calculation: 27 mins

## 2022-04-30 NOTE — PROGRESS NOTES
End of Shift Note    Bedside shift change report given to Roberto Lewis RN (oncoming nurse) by Lisa Bates RN (offgoing nurse). Report included the following information SBAR, Kardex, MAR and Recent Results    Shift worked:  7p-7a   Shift summary and any significant changes:    New admission. Wife at bedside. Complaints of blurry vision. Headache resolved. No other complaints. Echo today   Concerns for physician to address: none   Zone phone for oncoming shift:  [phone]     Patient Information  Madison Rojas  43 y.o.  4/29/2022  4:54 PM by Geraldo Lim MD. Madison Rojas was admitted from Home    Problem List  Patient Active Problem List    Diagnosis Date Noted   Neo Fox vision 04/29/2022    Headache 04/29/2022     No past medical history on file. Core Measures:  CVA: No Yes  CHF:No No  PNA:No No    Activity:  Activity Level: Up ad mike  Number times ambulated in hallways past shift: 0  Number of times OOB to chair past shift: 0    Cardiac:   Cardiac Monitoring: Yes           Access:   Current line(s): PIV   Central Line? No    Genitourinary:   Urinary status: voiding  Urinary Catheter? No     Respiratory:   O2 Device: None (Room air)  Chronic home O2 use?: NO  Incentive spirometer at bedside: NO       GI:  Last Bowel Movement Date:  (PTA)  Current diet:  ADULT DIET Regular  Passing flatus: YES  Tolerating current diet: YES       Pain Management:   Patient states pain is manageable on current regimen: YES    Skin:  Tarun Score: 23  Interventions: increase time out of bed    Patient Safety:  Fall Score:  Total Score: 0  Interventions: pt to call before getting OOB     @Rollbelt  @dexterity to release roll belt  Yes/No ( must document dexterity  here by stating Yes or No here, otherwise this is a restraint and must follow restraint documentation policy.)    DVT prophylaxis:  DVT prophylaxis Med- Yes  DVT prophylaxis SCD or ARIA- No     Wounds: (If Applicable)  Wounds- No  Location     Active Consults:  IP CONSULT TO NEUROLOGY    Length of Stay:  Expected LOS: - - -  Actual LOS: 1  Discharge Plan: Yes; missy Perez RN

## 2022-04-30 NOTE — H&P
Hospitalist Admission Note    NAME: Chapo Kinsey   :  1985   MRN:  [id number]     Date/Time:  2022 8:13 PM    Patient PCP: None  ________________________________________________________________________    My assessment of this patient's clinical condition and my plan of care is as follows. Assessment / Plan:  Dizziness, blurred vision rule out CVA  Right-sided headache  Near syncopal episode  -Patient presenting with 2 weeks of dizziness, had near syncopal episode today along with some headache, blurred vision  -Had a recent CTA which showed evidence of 1 to 2 mm cerebral aneurysm. CTA and CT perfusion preliminary report is now normal.  CT head shows no acute process  -Check MRI of the brain. Check 2D echocardiogram.  -Check hemoglobin A1c and lipid panel  -Start aspirin 81 mg daily  -Consult PT OT. Consult neurology. -EKG shows normal sinus rhythm. Telemetry monitoring. Follow-up on results of 2D echocardiogram.  Check orthostatic vitals. Elevated INR  -INR is 1.9. Could be nutritional vitamin K deficiency. We will give 1 dose of vitamin K. LFTs are within normal limits.  -Check INR in a.m. 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   male who presents with no significant past medical history is coming the hospital chief complaints of dizziness since the last 2 weeks. Patient reports pain is a travel dental about 2 weeks ago when he started having some dizziness. He presented to the emergency department that time and had work-up and was discharged on meclizine.   He reports that since 7 yesterday started having blurry vision in his right eye along with some dizziness and also started having some headache mostly involving the right side of his head which is about throbbing type. Into the back of the neck, without any aggravating or relieving factors. He also reports having a sensation of blacking out on 2 occasions. He also reports having a spinning sensation and felt that he has difficulty with the balance and also walking. Does not report any focal weakness, tingling or numbness. On arrival to ED, vital signs are normal.  On labs CBC is normal.  INR is 1.9. CMP is normal.  CT head shows no acute process. CTA preliminary report and CT perfusion were normal.    We were asked to admit for work up and evaluation of the above problems. Past medical history  Cerebral aneurysm      Past surgical history        Social History     Tobacco Use    Smoking status: Former Smoker    Smokeless tobacco: Never Used   Substance Use Topics    Alcohol use: Not Currently        Family history  No CAD in the family    No Known Allergies     Prior to Admission medications    Medication Sig Start Date End Date Taking? Authorizing Provider   meclizine (ANTIVERT) 25 mg tablet Take 1 Tablet by mouth three (3) times daily as needed for Dizziness. 4/8/22   Kishan Alvarez MD       REVIEW OF SYSTEMS:     I am not able to complete the review of systems because:    The patient is intubated and sedated    The patient has altered mental status due to his acute medical problems    The patient has baseline aphasia from prior stroke(s)    The patient has baseline dementia and is not reliable historian    The patient is in acute medical distress and unable to provide information           Total of 12 systems reviewed as follows:       POSITIVE= underlined text  Negative = text not underlined  General:  fever, chills, sweats, generalized weakness, weight loss/gain,      loss of appetite   Eyes:    blurred vision, eye pain, loss of vision, double vision  ENT:    rhinorrhea, pharyngitis   Respiratory:   cough, sputum production, SOB, LARA, wheezing, pleuritic pain   Cardiology:   chest pain, palpitations, orthopnea, PND, edema, syncope   Gastrointestinal:  abdominal pain , N/V, diarrhea, dysphagia, constipation, bleeding   Genitourinary:  frequency, urgency, dysuria, hematuria, incontinence   Muskuloskeletal :  arthralgia, myalgia, back pain  Hematology:  easy bruising, nose or gum bleeding, lymphadenopathy   Dermatological: rash, ulceration, pruritis, color change / jaundice  Endocrine:   hot flashes or polydipsia   Neurological:  headache, dizziness, confusion, focal weakness, paresthesia,     Speech difficulties, memory loss, gait difficulty  Psychological: Feelings of anxiety, depression, agitation    Objective:   VITALS:    Visit Vitals  /65 (BP 1 Location: Left upper arm, BP Patient Position: At rest)   Pulse 78   Temp 97.8 °F (36.6 °C)   Resp 16   Ht 5' 7\" (1.702 m)   Wt 70.3 kg (154 lb 15.7 oz)   SpO2 98%   BMI 24.27 kg/m²       PHYSICAL EXAM:    General:    Alert, cooperative, no distress, appears stated age. HEENT: Atraumatic, anicteric sclerae, pink conjunctivae     No oral ulcers, mucosa moist, throat clear, dentition fair  Neck:  Supple, symmetrical,  thyroid: non tender  Lungs:   Clear to auscultation bilaterally. No Wheezing or Rhonchi. No rales. Chest wall:  No tenderness  No Accessory muscle use. Heart:   Regular  rhythm,  No  murmur   No edema  Abdomen:   Soft, non-tender. Not distended. Bowel sounds normal  Extremities: No cyanosis. No clubbing,      Skin turgor normal, Capillary refill normal, Radial dial pulse 2+  Skin:     Not pale. Not Jaundiced  No rashes   Psych:  Not anxious or agitated. Neurologic: EOMs intact. No facial asymmetry. No aphasia or slurred speech. Symmetrical strength, Sensation grossly intact. Alert and oriented X 4. _______________________________________________________________________  Care Plan discussed with:    Comments   Patient y    Family      RN y    Care Manager                    Consultant:      _______________________________________________________________________  Expected  Disposition:   Home with Family y   HH/PT/OT/RN    SNF/LTC    NINA    ________________________________________________________________________  TOTAL TIME:  61  Minutes    Critical Care Provided     Minutes non procedure based      Comments    y Reviewed previous records   >50% of visit spent in counseling and coordination of care y Discussion with patient and/or family and questions answered       ________________________________________________________________________  Signed: Katherin Guillen MD    Procedures: see electronic medical records for all procedures/Xrays and details which were not copied into this note but were reviewed prior to creation of Plan. LAB DATA REVIEWED:    Recent Results (from the past 24 hour(s))   GLUCOSE, POC    Collection Time: 04/29/22  4:49 PM   Result Value Ref Range    Glucose (POC) 123 (H) 65 - 117 mg/dL    Performed by Mahin Jim \"Shelia\"    CBC WITH AUTOMATED DIFF    Collection Time: 04/29/22  4:59 PM   Result Value Ref Range    WBC 6.7 4.1 - 11.1 K/uL    RBC 4.89 4. 10 - 5.70 M/uL    HGB 14.4 12.1 - 17.0 g/dL    HCT 43.1 36.6 - 50.3 %    MCV 88.1 80.0 - 99.0 FL    MCH 29.4 26.0 - 34.0 PG    MCHC 33.4 30.0 - 36.5 g/dL    RDW 12.0 11.5 - 14.5 %    PLATELET 792 544 - 823 K/uL    MPV 8.3 (L) 8.9 - 12.9 FL    NRBC 0.0 0  WBC    ABSOLUTE NRBC 0.00 0.00 - 0.01 K/uL    NEUTROPHILS 46 32 - 75 %    LYMPHOCYTES 37 12 - 49 %    MONOCYTES 12 5 - 13 %    EOSINOPHILS 3 0 - 7 %    BASOPHILS 1 0 - 1 %    IMMATURE GRANULOCYTES 1 (H) 0.0 - 0.5 %    ABS. NEUTROPHILS 3.1 1.8 - 8.0 K/UL    ABS. LYMPHOCYTES 2.5 0.8 - 3.5 K/UL    ABS. MONOCYTES 0.8 0.0 - 1.0 K/UL    ABS. EOSINOPHILS 0.2 0.0 - 0.4 K/UL    ABS. BASOPHILS 0.1 0.0 - 0.1 K/UL    ABS. IMM. GRANS. 0.0 0.00 - 0.04 K/UL    DF AUTOMATED     METABOLIC PANEL, COMPREHENSIVE    Collection Time: 04/29/22  4:59 PM   Result Value Ref Range    Sodium 140 136 - 145 mmol/L    Potassium 3.7 3.5 - 5.1 mmol/L    Chloride 108 97 - 108 mmol/L    CO2 26 21 - 32 mmol/L    Anion gap 6 5 - 15 mmol/L    Glucose 113 (H) 65 - 100 mg/dL    BUN 16 6 - 20 MG/DL    Creatinine 1.03 0.70 - 1.30 MG/DL    BUN/Creatinine ratio 16 12 - 20      GFR est AA >60 >60 ml/min/1.73m2    GFR est non-AA >60 >60 ml/min/1.73m2    Calcium 8.6 8.5 - 10.1 MG/DL    Bilirubin, total 0.3 0.2 - 1.0 MG/DL    ALT (SGPT) 45 12 - 78 U/L    AST (SGOT) 20 15 - 37 U/L    Alk.  phosphatase 63 45 - 117 U/L    Protein, total 8.0 6.4 - 8.2 g/dL    Albumin 3.9 3.5 - 5.0 g/dL    Globulin 4.1 (H) 2.0 - 4.0 g/dL    A-G Ratio 1.0 (L) 1.1 - 2.2     PROTHROMBIN TIME + INR    Collection Time: 04/29/22  4:59 PM   Result Value Ref Range    INR 1.9 (H) 0.9 - 1.1      Prothrombin time 18.8 (H) 9.0 - 11.1 sec   EKG, 12 LEAD, INITIAL    Collection Time: 04/29/22  5:40 PM   Result Value Ref Range    Ventricular Rate 78 BPM    Atrial Rate 78 BPM    P-R Interval 160 ms    QRS Duration 84 ms    Q-T Interval 366 ms    QTC Calculation (Bezet) 417 ms    Calculated P Axis 63 degrees    Calculated R Axis 29 degrees    Calculated T Axis 52 degrees    Diagnosis       Normal sinus rhythm  Normal ECG  When compared with ECG of 07-APR-2022 23:48,  No significant change was found

## 2022-04-30 NOTE — PROGRESS NOTES
Speech Pathology Note    Reviewed chart and note patient admitted with dizziness with concern for CVA. Note CT showed \"no acute intracranial process seen\" and MRI revealed \"normal study. \" Note patient passed the [facility] screen and a regular diet was ordered. NIHSS=1. Discussed case with RN who reported no SLP-related concerns. Introduced self and role of SLP to patient. Patient denied any decline in motor speech, language, cognitive, or swallowing function, and patient informally observed drinking thin liquids with no difficulty. Patient Ox4. Formal SLP evaluation not clinically indicated at this time. Will sign off. Please re-consult if further needs arise. Thank you.     Gisselle Thompson M.S., CCC-SLP

## 2022-04-30 NOTE — PROGRESS NOTES
Problem: Pain  Goal: *Control of Pain  Outcome: Progressing Towards Goal  Goal: *PALLIATIVE CARE:  Alleviation of Pain  Outcome: Progressing Towards Goal     Problem: Patient Education: Go to Patient Education Activity  Goal: Patient/Family Education  Outcome: Progressing Towards Goal     Problem: Patient Education: Go to Patient Education Activity  Goal: Patient/Family Education  Outcome: Progressing Towards Goal     Problem: TIA/CVA Stroke: 0-24 hours  Goal: Off Pathway (Use only if patient is Off Pathway)  Outcome: Progressing Towards Goal  Goal: Activity/Safety  Outcome: Progressing Towards Goal  Goal: Consults, if ordered  Outcome: Progressing Towards Goal  Goal: Diagnostic Test/Procedures  Outcome: Progressing Towards Goal  Goal: Nutrition/Diet  Outcome: Progressing Towards Goal  Goal: Discharge Planning  Outcome: Progressing Towards Goal  Goal: Medications  Outcome: Progressing Towards Goal  Goal: Respiratory  Outcome: Progressing Towards Goal  Goal: Treatments/Interventions/Procedures  Outcome: Progressing Towards Goal  Goal: Minimize risk of bleeding post-thrombolytic infusion  Outcome: Progressing Towards Goal  Goal: Monitor for complications post-thrombolytic infusion  Outcome: Progressing Towards Goal  Goal: Psychosocial  Outcome: Progressing Towards Goal  Goal: *Hemodynamically stable  Outcome: Progressing Towards Goal  Goal: *Neurologically stable  Description: Absence of additional neurological deficits    Outcome: Progressing Towards Goal  Goal: *Verbalizes anxiety and depression are reduced or absent  Outcome: Progressing Towards Goal  Goal: *Absence of Signs of Aspiration on Current Diet  Outcome: Progressing Towards Goal  Goal: *Absence of deep venous thrombosis signs and symptoms(Stroke Metric)  Outcome: Progressing Towards Goal  Goal: *Ability to perform ADLs and demonstrates progressive mobility and function  Outcome: Progressing Towards Goal  Goal: *Stroke education started(Stroke Metric)  Outcome: Progressing Towards Goal  Goal: *Dysphagia screen performed(Stroke Metric)  Outcome: Progressing Towards Goal  Goal: *Rehab consulted(Stroke Metric)  Outcome: Progressing Towards Goal     Problem: Ischemic Stroke: Discharge Outcomes  Goal: *Verbalizes anxiety and depression are reduced or absent  Outcome: Progressing Towards Goal  Goal: *Verbalize understanding of risk factor modification(Stroke Metric)  Outcome: Progressing Towards Goal  Goal: *Hemodynamically stable  Outcome: Progressing Towards Goal  Goal: *Absence of aspiration pneumonia  Outcome: Progressing Towards Goal  Goal: *Aware of needed dietary changes  Outcome: Progressing Towards Goal  Goal: *Verbalize understanding of prescribed medications including anti-coagulants, anti-lipid, and/or anti-platelets(Stroke Metric)  Outcome: Progressing Towards Goal  Goal: *Tolerating diet  Outcome: Progressing Towards Goal  Goal: *Aware of follow-up diagnostics related to anticoagulants  Outcome: Progressing Towards Goal  Goal: *Ability to perform ADLs and demonstrates progressive mobility and function  Outcome: Progressing Towards Goal  Goal: *Absence of DVT(Stroke Metric)  Outcome: Progressing Towards Goal  Goal: *Absence of aspiration  Outcome: Progressing Towards Goal  Goal: *Optimal pain control at patient's stated goal  Outcome: Progressing Towards Goal  Goal: *Home safety concerns addressed  Outcome: Progressing Towards Goal  Goal: *Describes available resources and support systems  Outcome: Progressing Towards Goal  Goal: *Verbalizes understanding of activation of EMS(911) for stroke symptoms(Stroke Metric)  Outcome: Progressing Towards Goal  Goal: *Understands and describes signs and symptoms to report to providers(Stroke Metric)  Outcome: Progressing Towards Goal  Goal: *Neurolgocially stable (absence of additional neurological deficits)  Outcome: Progressing Towards Goal  Goal: *Verbalizes importance of follow-up with primary care physician(Stroke Metric)  Outcome: Progressing Towards Goal  Goal: *Smoking cessation discussed,if applicable(Stroke Metric)  Outcome: Progressing Towards Goal  Goal: *Depression screening completed(Stroke Metric)  Outcome: Progressing Towards Goal     Problem: Falls - Risk of  Goal: *Absence of Falls  Description: Document Shakeel Fall Risk and appropriate interventions in the flowsheet.   Outcome: Progressing Towards Goal  Note: Fall Risk Interventions:                                Problem: Patient Education: Go to Patient Education Activity  Goal: Patient/Family Education  Outcome: Progressing Towards Goal

## 2022-04-30 NOTE — PROGRESS NOTES
Patient awaiting his wife to pick him up for discharge. His belongings are packed in a bag and his discharge instructions were reviewed with him. He voices understanding.

## 2022-05-01 LAB
ATRIAL RATE: 78 BPM
CALCULATED P AXIS, ECG09: 63 DEGREES
CALCULATED R AXIS, ECG10: 29 DEGREES
CALCULATED T AXIS, ECG11: 52 DEGREES
DIAGNOSIS, 93000: NORMAL
ECHO AO ASC DIAM: 2.5 CM
ECHO AO ASCENDING AORTA INDEX: 1.38 CM/M2
ECHO AO ROOT DIAM: 2.6 CM
ECHO AO ROOT INDEX: 1.44 CM/M2
ECHO AV AREA PEAK VELOCITY: 1.8 CM2
ECHO AV AREA VTI: 1.7 CM2
ECHO AV AREA/BSA PEAK VELOCITY: 1 CM2/M2
ECHO AV AREA/BSA VTI: 0.9 CM2/M2
ECHO AV MEAN GRADIENT: 4 MMHG
ECHO AV MEAN VELOCITY: 0.9 M/S
ECHO AV PEAK GRADIENT: 7 MMHG
ECHO AV PEAK VELOCITY: 1.3 M/S
ECHO AV VELOCITY RATIO: 0.54
ECHO AV VTI: 26.4 CM
ECHO LA DIAMETER INDEX: 1.49 CM/M2
ECHO LA DIAMETER: 2.7 CM
ECHO LA TO AORTIC ROOT RATIO: 1.04
ECHO LV EDV A4C: 91 ML
ECHO LV EDV INDEX A4C: 50 ML/M2
ECHO LV EJECTION FRACTION A4C: 52 %
ECHO LV ESV A4C: 44 ML
ECHO LV ESV INDEX A4C: 24 ML/M2
ECHO LV FRACTIONAL SHORTENING: 33 % (ref 28–44)
ECHO LV INTERNAL DIMENSION DIASTOLE INDEX: 2.38 CM/M2
ECHO LV INTERNAL DIMENSION DIASTOLIC: 4.3 CM (ref 4.2–5.9)
ECHO LV INTERNAL DIMENSION SYSTOLIC INDEX: 1.6 CM/M2
ECHO LV INTERNAL DIMENSION SYSTOLIC: 2.9 CM
ECHO LV IVSD: 0.8 CM (ref 0.6–1)
ECHO LV MASS 2D: 105.3 G (ref 88–224)
ECHO LV MASS INDEX 2D: 58.2 G/M2 (ref 49–115)
ECHO LV POSTERIOR WALL DIASTOLIC: 0.8 CM (ref 0.6–1)
ECHO LV RELATIVE WALL THICKNESS RATIO: 0.37
ECHO LVOT AREA: 3.1 CM2
ECHO LVOT AV VTI INDEX: 0.55
ECHO LVOT DIAM: 2 CM
ECHO LVOT MEAN GRADIENT: 1 MMHG
ECHO LVOT PEAK GRADIENT: 2 MMHG
ECHO LVOT PEAK VELOCITY: 0.7 M/S
ECHO LVOT STROKE VOLUME INDEX: 25.2 ML/M2
ECHO LVOT SV: 45.5 ML
ECHO LVOT VTI: 14.5 CM
ECHO MV A VELOCITY: 0.5 M/S
ECHO MV E DECELERATION TIME (DT): 241 MS
ECHO MV E VELOCITY: 0.68 M/S
ECHO MV E/A RATIO: 1.36
ECHO PV MAX VELOCITY: 0.9 M/S
ECHO PV PEAK GRADIENT: 3 MMHG
ECHO RV TAPSE: 2.5 CM (ref 1.7–?)
P-R INTERVAL, ECG05: 160 MS
Q-T INTERVAL, ECG07: 366 MS
QRS DURATION, ECG06: 84 MS
QTC CALCULATION (BEZET), ECG08: 417 MS
VENTRICULAR RATE, ECG03: 78 BPM

## 2022-05-02 NOTE — ADT AUTH CERT NOTES
[address]     FACILITY NPI :[id number]     [address]  [facility] NEUROSCIENCE TELEMETRY  [address] [facility]  Kay Chavez [address] [phone]     [facility]: [phone]  Fax: [phone]       Patient Name :Ck Padilla   : 1985 (36 yrs)  MRN : [id number]     Patient Mailing Address [address]ava [47] , [address]                                                             .         Insurance Plan Payor: Bhupendra Meier / Plan: [address] [place] [address] / Product Type: PPO /      Primary Coverage Subscriber ID : [id number]         Current Patient Class : INPATIENT  Admit Date : 2022     REQUESTED LEVEL OF CARE: INPATIENT [101]                                                           Diagnosis : Headache;Blurry vision                          ICD10 Code : Headache [R51.9]  Blurry vision [H53.8]     Current Room and Bed      Admitting and Attending Info:  Admitting Provider : Yash Rios MD   NPI: [id number]  Admitting Provider Phone.  [phone]  Admitting Provider Address: Khadijah Cardoza     Attending Provider No att. providers found   NPI  Attending Provider Address:  Khadijah Cardoza                                                   [address] [place] [address]     Attending Provider Phone: Attending johanna phone: N/A               Utilization Reviews         Headaches - Care Day 1 (2022) by Valarie Ware       Review Entered Review Status   2022 09:02 Completed      Criteria Review      Care Day: 1 Care Date: 2022 Level of Care: Telemetry    Guideline Day 1    Level Of Care    (X) Floor    2022 09:02:38 EDT by Ravi Hammonds    Clinical Status    (X) * Clinical Indications met    2022 09:02:38 EDT by Claudia Parrish 36yr old male with PMH as doc to ED c/o throbbing rt side of face acutely worsening over last 2 days with new rt visual disturbance. 98.0, 91, 119/78, 19, RA sat 98%. 70.3kg. Gluc 113, glob 4.1. INR 1.9    (X) Intractable symptoms not responsive to outpatient treatment    5/2/2022 09:02:39 EDT by Telma Carranza      attempted po meclizine but worsening acutely over the last 2 days incl new rt eye visual disturbances    Routes    (X) IV medications    5/2/2022 09:02:39 EDT by Telma Carranza      Reglan 10mg IV NOW, refused IV zofran. Lovenox 40mg sq q24H    (X) Diet as tolerated    5/2/2022 09:02:39 EDT by Telma Carranza      reg diet    Interventions    (X) Possible imaging study    5/2/2022 09:02:39 EDT by Telma Carranza      CT head: no acute IC process. CTA head+neck: no acute vasc abn, no lg vessel occlusion, normal perfusion/no sig stenosis. MRI brain: normal study. 12 lead EKG: NSR, rate 78    * Milestone   Additional Notes   Assessment / Plan:   Dizziness, blurred vision rule out CVA   Right-sided headache   Near syncopal episode   -Patient presenting with 2 weeks of dizziness, had near syncopal episode today along with some headache, blurred vision   -Had a recent CTA which showed evidence of 1 to 2 mm cerebral aneurysm.  CTA and CT perfusion preliminary report is now normal.  CT head shows no acute process   -Check MRI of the brain.  Check 2D echocardiogram.   -Check hemoglobin A1c and lipid panel   -Start aspirin 81 mg daily   -Consult PT OT. [facility] neurology. -EKG shows normal sinus rhythm.  Telemetry monitoring.  Follow-up on results of 2D echocardiogram.  Check orthostatic vitals.       Elevated INR   -INR is 1.9.  Could be nutritional vitamin K deficiency.  We will give 1 dose of vitamin K.   LFTs are within normal limits.   -Check INR in a.m. 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      HEENT:           Atraumatic, anicteric sclerae, pink conjunctivae                           No oral ulcers, mucosa moist, throat clear, dentition fair   Neck:               Supple, symmetrical,  thyroid: non tender   Lungs:             Clear to auscultation bilaterally.  No Wheezing or Rhonchi. No rales. Chest wall:      No tenderness  No Accessory muscle use. Heart:              Regular  rhythm,  No  murmur   No edema   Abdomen:        Soft, non-tender. Not distended.  Bowel sounds normal   Extremities:     No cyanosis.  No clubbing,                             Skin turgor normal, Capillary refill normal, Radial dial pulse 2+   Skin:                Not pale.  Not Jaundiced  No rashes    Psych:             Not anxious or agitated. Neurologic:      EOMs intact. No facial asymmetry. No aphasia or slurred speech. Symmetrical strength, Sensation grossly intact. Alert and oriented X 4.            Headaches - Clinical Indications for Admission to Inpatient Care by Valarie Ware       Review Entered Review Status   5/2/2022 08:55 Completed      Criteria Review      Clinical Indications for Admission to Inpatient Care    Most Recent : Ravi Morales Most Recent Date: 5/2/2022 08:55:15 EDT    (X) Admission is indicated for  1 or more  of the following  (1) (2) (3) (4) (5) (6) (7) (8):       (X) New-onset focal neurologic deficit       5/2/2022 08:55:15 EDT by Ravi Morales         rt sided pressure pain to rt side of face and blurred vision of mainly rt eye    Notes:    5/2/2022 08:55:15 EDT by Ravi Morales    Subject: Additional Clinical Information      * 36yr old male with no sig PMH to ED c/o dizziness x2 weeks-previous presentation to ED for same c/o and sent home with po meclizine.  Pt now c/o blurry vision to rt eye and throbbing headache mostly involving rt side of head with dizziness-visual disturbances new for last 2 days. Admit           H&P Notes       H&P by Sandra Fonseca MD at 22 documented on ED to Hosp-Admission (Discharged) from 2022 in [facility] 3 NEUROSCIENCE TELEMETRY    Author: Sandra Fonseca MD Author Type: Physician Filed: 22   Note Status: Signed Cosign: Cosign Not Required Date of Service: 22   : Sandra Fonseca MD (Physician)               Expand AllCollapse All                    Hospitalist Admission Note     NAME:            Andi Deutsch   :               1985   MRN:               [id number]      Date/Time:      2022 8:13 PM     Patient PCP: None  ________________________________________________________________________     My assessment of this patient's clinical condition and my plan of care is as follows.     Assessment / Plan:  Dizziness, blurred vision rule out CVA  Right-sided headache  Near syncopal episode  -Patient presenting with 2 weeks of dizziness, had near syncopal episode today along with some headache, blurred vision  -Had a recent CTA which showed evidence of 1 to 2 mm cerebral aneurysm. CTA and CT perfusion preliminary report is now normal.  CT head shows no acute process  -Check MRI of the brain. Check 2D echocardiogram.  -Check hemoglobin A1c and lipid panel  -Start aspirin 81 mg daily  -Consult PT OT. Consult neurology. -EKG shows normal sinus rhythm. Telemetry monitoring. Follow-up on results of 2D echocardiogram.  Check orthostatic vitals.     Elevated INR  -INR is 1.9. Could be nutritional vitamin K deficiency. We will give 1 dose of vitamin K.   LFTs are within normal limits.  -Check INR in a.m. 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   male who presents with no significant past medical history is coming the hospital chief complaints of dizziness since the last 2 weeks. Patient reports pain is a travel dental about 2 weeks ago when he started having some dizziness. He presented to the emergency department that time and had work-up and was discharged on meclizine. He reports that since 7 yesterday started having blurry vision in his right eye along with some dizziness and also started having some headache mostly involving the right side of his head which is about throbbing type. Into the back of the neck, without any aggravating or relieving factors. He also reports having a sensation of blacking out on 2 occasions. He also reports having a spinning sensation and felt that he has difficulty with the balance and also walking. Does not report any focal weakness, tingling or numbness.     On arrival to ED, vital signs are normal.  On labs CBC is normal.  INR is 1.9. CMP is normal.  CT head shows no acute process. CTA preliminary report and CT perfusion were normal.     We were asked to admit for work up and evaluation of the above problems.      Past medical history  Cerebral aneurysm        Past surgical history           Social History           Tobacco Use    Smoking status: Former Smoker    Smokeless tobacco: Never Used   Substance Use Topics    Alcohol use: Not Currently         Family history  No CAD in the family     No Known Allergies              Prior to Admission medications    Medication Sig Start Date End Date Taking? Authorizing Provider   meclizine (ANTIVERT) 25 mg tablet Take 1 Tablet by mouth three (3) times daily as needed for Dizziness.  4/8/22     Wendy Ramon MD         REVIEW OF SYSTEMS:     I am not able to complete the review of systems because:    The patient is intubated and sedated     The patient has altered mental status due to his acute medical problems     The patient has baseline aphasia from prior stroke(s)     The patient has baseline dementia and is not reliable historian     The patient is in acute medical distress and unable to provide information               Total of 12 systems reviewed as follows:                                        POSITIVE= underlined text  Negative = text not underlined  General:                     fever, chills, sweats, generalized weakness, weight loss/gain,                                       loss of appetite   Eyes:                           blurred vision, eye pain, loss of vision, double vision  ENT:                            rhinorrhea, pharyngitis   Respiratory:               cough, sputum production, SOB, LARA, wheezing, pleuritic pain   Cardiology:                chest pain, palpitations, orthopnea, PND, edema, syncope   Gastrointestinal:       abdominal pain , N/V, diarrhea, dysphagia, constipation, bleeding   Genitourinary:           frequency, urgency, dysuria, hematuria, incontinence   Muskuloskeletal :      arthralgia, myalgia, back pain  Hematology:              easy bruising, nose or gum bleeding, lymphadenopathy   Dermatological:         rash, ulceration, pruritis, color change / jaundice  Endocrine:                 hot flashes or polydipsia   Neurological:             headache, dizziness, confusion, focal weakness, paresthesia,                                      Speech difficulties, memory loss, gait difficulty  Psychological:          Feelings of anxiety, depression, agitation     Objective:   VITALS:    Visit Vitals  /65 (BP 1 Location: Left upper arm, BP Patient Position: At rest)   Pulse 78   Temp 97.8 °F (36.6 °C)   Resp 16   Ht 5' 7\" (1.702 m)   Wt 70.3 kg (154 lb 15.7 oz)   SpO2 98%   BMI 24.27 kg/m²       PHYSICAL EXAM:     General:          Alert, cooperative, no distress, appears stated age. HEENT:           Atraumatic, anicteric sclerae, pink conjunctivae                          No oral ulcers, mucosa moist, throat clear, dentition fair  Neck:               Supple, symmetrical,  thyroid: non tender  Lungs:             Clear to auscultation bilaterally. No Wheezing or Rhonchi. No rales. Chest wall:      No tenderness  No Accessory muscle use. Heart:              Regular  rhythm,  No  murmur   No edema  Abdomen:        Soft, non-tender. Not distended. Bowel sounds normal  Extremities:     No cyanosis. No clubbing,                            Skin turgor normal, Capillary refill normal, Radial dial pulse 2+  Skin:                Not pale. Not Jaundiced  No rashes   Psych:             Not anxious or agitated. Neurologic:      EOMs intact. No facial asymmetry. No aphasia or slurred speech. Symmetrical strength, Sensation grossly intact.  Alert and oriented X 4.      _______________________________________________________________________  Care Plan discussed with:      Comments   Patient y     Family        RN y     Care Manager                      Consultant:        _______________________________________________________________________  Expected  Disposition:   Home with Family y   HH/PT/OT/RN     SNF/LTC     [facility]INA     ________________________________________________________________________  TOTAL TIME:  61  Minutes     Critical Care Provided     Minutes non procedure based         Comments     y Reviewed previous records   >50% of visit spent in counseling and coordination of care y Discussion with patient and/or family and questions answered         ________________________________________________________________________  Signed: Sudha Moore MD     Procedures: see electronic medical records for all procedures/Xrays and details which were not copied into this note but were reviewed prior to creation of Plan.     LAB DATA REVIEWED:    Recent Results          Recent Results (from the past 24 hour(s))   GLUCOSE, POC     Collection Time: 04/29/22  4:49 PM   Result Value Ref Range     Glucose (POC) 123 (H) 65 - 117 mg/dL     Performed by Doni Prater \"Shelia\"     CBC WITH AUTOMATED DIFF     Collection Time: 04/29/22  4:59 PM   Result Value Ref Range     WBC 6.7 4.1 - 11.1 K/uL     RBC 4.89 4. 10 - 5.70 M/uL     HGB 14.4 12.1 - 17.0 g/dL     HCT 43.1 36.6 - 50.3 %     MCV 88.1 80.0 - 99.0 FL     MCH 29.4 26.0 - 34.0 PG     MCHC 33.4 30.0 - 36.5 g/dL     RDW 12.0 11.5 - 14.5 %     PLATELET 629 673 - 611 K/uL     MPV 8.3 (L) 8.9 - 12.9 FL     NRBC 0.0 0  WBC     ABSOLUTE NRBC 0.00 0.00 - 0.01 K/uL     NEUTROPHILS 46 32 - 75 %     LYMPHOCYTES 37 12 - 49 %     MONOCYTES 12 5 - 13 %     EOSINOPHILS 3 0 - 7 %     BASOPHILS 1 0 - 1 %     IMMATURE GRANULOCYTES 1 (H) 0.0 - 0.5 %     ABS. NEUTROPHILS 3.1 1.8 - 8.0 K/UL     ABS. LYMPHOCYTES 2.5 0.8 - 3.5 K/UL     ABS. MONOCYTES 0.8 0.0 - 1.0 K/UL     ABS. EOSINOPHILS 0.2 0.0 - 0.4 K/UL     ABS. BASOPHILS 0.1 0.0 - 0.1 K/UL     ABS. IMM. GRANS. 0.0 0.00 - 0.04 K/UL     DF AUTOMATED     METABOLIC PANEL, COMPREHENSIVE     Collection Time: 04/29/22  4:59 PM   Result Value Ref Range     Sodium 140 136 - 145 mmol/L     Potassium 3.7 3.5 - 5.1 mmol/L     Chloride 108 97 - 108 mmol/L     CO2 26 21 - 32 mmol/L     Anion gap 6 5 - 15 mmol/L     Glucose 113 (H) 65 - 100 mg/dL     BUN 16 6 - 20 MG/DL     Creatinine 1.03 0.70 - 1.30 MG/DL     BUN/Creatinine ratio 16 12 - 20       GFR est AA >60 >60 ml/min/1.73m2     GFR est non-AA >60 >60 ml/min/1.73m2     Calcium 8.6 8.5 - 10.1 MG/DL     Bilirubin, total 0.3 0.2 - 1.0 MG/DL     ALT (SGPT) 45 12 - 78 U/L     AST (SGOT) 20 15 - 37 U/L     Alk.  phosphatase 63 45 - 117 U/L     Protein, total 8.0 6.4 - 8.2 g/dL     Albumin 3.9 3.5 - 5.0 g/dL     Globulin 4.1 (H) 2.0 - 4.0 g/dL     A-G Ratio 1.0 (L) 1.1 - 2.2     PROTHROMBIN TIME + INR     Collection Time: 04/29/22  4:59 PM   Result Value Ref Range     INR 1.9 (H) 0.9 - 1.1       Prothrombin time 18.8 (H) 9.0 - 11.1 sec   EKG, 12 LEAD, INITIAL     Collection Time: 04/29/22  5:40 PM   Result Value Ref Range     Ventricular Rate 78 BPM     Atrial Rate 78 BPM     P-R Interval 160 ms     QRS Duration 84 ms     Q-T Interval 366 ms     QTC Calculation (Bezet) 417 ms     Calculated P Axis 63 degrees     Calculated R Axis 29 degrees     Calculated T Axis 52 degrees     Diagnosis           Normal sinus rhythm  Normal ECG  When compared with ECG of 07-APR-2022 23:48,  No significant change was found

## 2022-05-12 NOTE — DISCHARGE INSTRUCTIONS
Patient Discharge Instructions    Madison Rojas / [id number] : 1985    Admitted 2022 Discharged: 2022         DISCHARGE DIAGNOSIS:   Complex migraine   Hx of RA      Take Home Medications     {Medication reconciliation information is now added to the patient's AVS automatically when it is printed. There is no need to use this SmartLink in discharge instructions. Highlight this text and delete it to clear this message}      General drug facts      If you have a very bad allergy, wear an allergy ID at all times.  It is important that you take the medication exactly as they are prescribed.  Keep your medication in the bottles provided by the pharmacist.  Francisco Reedil a list of all your drugs (prescription, natural products, vitamins, OTC) with you. Give this list to your doctor.  Do not take other medications without consulting your doctor.   Do not share your drugs with others and do not take anyone else's drugs.  Keep all drugs out of the reach of children and pets.   Most drugs may be thrown away in household trash after mixing with coffee grounds or roma litter and sealing in a plastic bag.   Keep a list Call your doctor for help with any side effects. If in the [place] you may also call the FDA at 7-331-BOG-1405     Talk with the doctor before starting any new drug, including OTC, natural products, or vitamins. What to do at Home    1. Recommended diet: regular     2. Recommended activity: Activity as tolerated    3. If you experience any of the following symptoms then please call your primary care physician or return to the emergency room if you cannot get hold of your doctor:    4. Wound Care: none    5. Lab work: none    6. Bring these papers with you to your follow up appointments.  The papers will help your doctors be sure to continue the care plan from the hospital.      If you have questions regarding the hospital related prescriptions or hospital related issues please call [facility] at . You can always direct your questions to your primary care doctor if you are unable to reach your hospital physician; your PCP works as an extension of your hospital doctor just like your hospital doctor is an extension of your PCP for your time at the hospital [facility])      Follow-up with:   PCP: None  Follow-up Information     Follow up With Specialties Details Why Contact Info    Yoni Aguilar NP Neurology Schedule an appointment as soon as possible for a visit in 4 weeks Hospital Follow up [address]      Ricco Lugo MD Endovascular Surgical Neuroradiology Schedule an appointment as soon as possible for a visit in 4 weeks Hospital Follow up incidential small aneurysm [address] [facility]  [address]  [phone]      None    None (395) Patient stated that they have no PCP      Zeina Lim MD Neurology Schedule an appointment as soon as possible for a visit in 1 week  [address]  [address] [facility]  [phone]             Please call for your own appointment        Information obtained by :  I understand that if any problems occur once I am at home I am to contact my physician. I understand and acknowledge receipt of the instructions indicated above.                                                                                                                                            Physician's or R.N.'s Signature                                                                  Date/Time                                                                                                                                              Patient or Representative Signature                                                          Date/Time

## 2022-05-12 NOTE — DISCHARGE SUMMARY
Hospitalist Discharge Summary     Patient ID:  See Escalante  [id number]  30 y.o.  1985 4/29/2022    PCP on record: None    Admit date: 4/29/2022  Discharge date and time: 4/30/2022    DISCHARGE DIAGNOSIS:    Complex migraine   Hx of RA  5 mm pulmonary nodule  2 mm right carotid aneurysm    CONSULTATIONS:  IP CONSULT TO NEUROLOGY    Excerpted HPI from H&P of Moisés Barajas MD:  See Escalante is a 43 y.o.   male who presents with no significant past medical history is coming the hospital chief complaints of dizziness since the last 2 weeks. Patient reports pain is a travel dental about 2 weeks ago when he started having some dizziness. He presented to the emergency department that time and had work-up and was discharged on meclizine. He reports that since 7 yesterday started having blurry vision in his right eye along with some dizziness and also started having some headache mostly involving the right side of his head which is about throbbing type. Into the back of the neck, without any aggravating or relieving factors. He also reports having a sensation of blacking out on 2 occasions. He also reports having a spinning sensation and felt that he has difficulty with the balance and also walking. Does not report any focal weakness, tingling or numbness. ______________________________________________________________________  DISCHARGE SUMMARY/HOSPITAL COURSE:  for full details see H&P, daily progress notes, labs, consult notes. Patient was admitted to hospital and noted to have possible complex migraine and a consultation was placed with neurology service. Patient underwent a complete work-up including CT of head which was normal.  MRI of the brain was within normal limits. CTA showed previous 5 mm right pulmonary nodule and was advised to follow-up with PCP on outpatient basis.   Patient received migraine cocktail with improvement of headache and was cleared by neurology to be discharged for outpatient follow-up on his medications below        _______________________________________________________________________  Patient seen and examined by me on discharge day. Pertinent Findings:  Gen:    Not in distress  Chest: Clear lungs  CVS:   Regular rhythm. No edema  Abd:  Soft, not distended, not tender  Neuro:  Alert, awake  _______________________________________________________________________  DISCHARGE MEDICATIONS:   Discharge Medication List as of 4/30/2022  7:38 PM      CONTINUE these medications which have NOT CHANGED    Details   meclizine (ANTIVERT) 25 mg tablet Take 1 Tablet by mouth three (3) times daily as needed for Dizziness., Normal, Disp-20 Tablet, R-0               Patient Follow Up Instructions:     1. Recommended diet: regular     2. Recommended activity: Activity as tolerated    3. If you experience any of the following symptoms then please call your primary care physician or return to the emergency room if you cannot get hold of your doctor:    4. Wound Care: none    5.  Lab work: none        Follow-up Information     Follow up With Specialties Details Why Contact Info    Farhana Mancera NP Neurology Schedule an appointment as soon as possible for a visit in 4 weeks Hospital Follow up [address] Yanira Lockwood MD Endovascular Surgical Neuroradiology Schedule an appointment as soon as possible for a visit in 4 weeks Hospital Follow up [place] small aneurysm [address]  [place] [address]  [phone]      None    None ([phone]) Patient stated that they have no PCP      Mayra Verduzco MD Neurology Schedule an appointment as soon as possible for a visit in 1 week  [address]  [address] [facility]  [phone]          ________________________________________________________________    Risk of deterioration: High    Condition at Discharge: Stable  __________________________________________________________________    Disposition  Home with family, no needs    ____________________________________________________________________    Code Status: Full Code  ___________________________________________________________________      Total time in minutes spent coordinating this discharge (includes going over instructions, follow-up, prescriptions, and preparing report for sign off to her PCP) :  35 minutes    Signed:  Rhoda Calle MD

## 2022-06-13 ENCOUNTER — OFFICE VISIT (OUTPATIENT)
Dept: NEUROLOGY | Age: 37
End: 2022-06-13
Payer: COMMERCIAL

## 2022-06-13 VITALS — DIASTOLIC BLOOD PRESSURE: 78 MMHG | RESPIRATION RATE: 20 BRPM | SYSTOLIC BLOOD PRESSURE: 114 MMHG

## 2022-06-13 DIAGNOSIS — G44.86 CERVICOGENIC HEADACHE: Primary | ICD-10-CM

## 2022-06-13 DIAGNOSIS — H81.10 BENIGN PAROXYSMAL POSITIONAL VERTIGO, UNSPECIFIED LATERALITY: ICD-10-CM

## 2022-06-13 DIAGNOSIS — L40.50 PSORIATIC ARTHRITIS (HCC): ICD-10-CM

## 2022-06-13 PROCEDURE — 99215 OFFICE O/P EST HI 40 MIN: CPT | Performed by: PSYCHIATRY & NEUROLOGY

## 2022-06-13 NOTE — PROGRESS NOTES
NEUROLOGY CLINIC NOTE    Patient ID:  Claudene Del  [id number]  17 y.o.  1985    Date of Consultation:  June 13, 2022    Reason for Consultation:  Hospital follow up    Chief Complaint   Patient presents with    Follow-up     migraines        History of Present Illness:     Patient Active Problem List    Diagnosis Date Noted   [place] vision 04/29/2022    Headache 04/29/2022     No past medical history on file. No past surgical history on file. Prior to Admission medications    Medication Sig Start Date End Date Taking? Authorizing Provider   meclizine (ANTIVERT) 25 mg tablet Take 1 Tablet by mouth three (3) times daily as needed for Dizziness. Patient not taking: Reported on 4/30/2022 4/8/22   Demaris Angelucci, MD     No Known Allergies   Social History     Tobacco Use    Smoking status: Former Smoker    Smokeless tobacco: Never Used   Substance Use Topics    Alcohol use: Not Currently      No family history on file. Subjective:      Claudene Del is a 43 y.o. right-handed male with history of psoriatic arthritis (Humira) who is here for follow up after a recent hospitalization. Patient was admitted at [facility] 4/29/2022 for right-sided headache, vertigo, nausea and blurry vision. Patient for the past 2 days has been having right-sided throbbing headache that is 7/10 in intensity associated with pressure pain right side of the face, nausea, vertigo and blurred vision mainly of just the right eye. Issues with blurred vision has been ongoing for the past 2 weeks as well as issues with vertigo. Patient was prescribed meclizine with no clear benefit. Review of records revealed: Patient was seen at the ER 4/7/2022 complaining of dizziness and spinning sensation. 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 No significant flow-limiting stenosis or occlusion. No ICA stenosis. In the ER, blood pressure was 119/78.   Laboratory work-up revealed unremarkable CBC, TSH and CMP. Elevated INR at 1.9. Head CT without contrast did not reveal any acute process. Head and neck CTA with contrast did not reveal any large vessel occlusion or flow-limiting stenosis. No clear aneurysm seen. No perfusion defect. Brain MRI without contrast was essentially normal. When seen, patient reports no recurrence of his headaches since being hospitalized. Reports vision of the right eye is improving. Asking if his issues could be side effect of Humira. Patient was discharged 4/30/2022. Since then, patient reports no recurrence of the severe right-sided throbbing headaches and blurring of vision. He still has episodes of mild vertigo when looking at certain directions that last for several seconds. Improves with position correction. Patient reports having milder headaches. Gradual, bifrontal and behind the eye and it does not radiate  Throbbing pain   3/10  Last a few hours  Excedrin or other OTC medication with benefit  Occurs 2 to 3 times a week    He has seen his rheumatologist and has been taken off Humira. He has tried Sulfasalazine, Methotrexate and Talz. Last prescription was cosentyx which he has not started. He has just been doing supplements and exercising. Outside reports reviewed: ER records, radiology reports, lab reports, historical medical records.     Review of Systems:    A comprehensive review of systems was performed:   Constitutional: positive for none  Eyes: positive for none  Ears, nose, mouth, throat, and face: positive for none  Respiratory: positive for none  Cardiovascular: positive for none  Gastrointestinal: positive for none  Genitourinary: positive for none  Integument/breast: positive for none  Hematologic/lymphatic: positive for none  Musculoskeletal: positive for joint pains  Neurological: positive for headache, vertigo  Behavioral/Psych: positive for none  Endocrine: positive for none  Allergic/Immunologic: positive for none      Objective:     Visit Vitals  /78 (BP 1 Location: Right arm, BP Patient Position: Sitting, BP Cuff Size: Adult)   Resp 20     PHYSICAL EXAM:    NEUROLOGICAL EXAM:    Appearance: The patient is well developed, well nourished, provides a coherent history and is in no acute distress. Mental Status: Oriented to time, place and person. Fluent, no aphasia or dysarthria. Mood and affect appropriate. Cranial Nerves:   Intact visual fields. ERICKA, EOM's full, no nystagmus, no ptosis. Facial sensation is normal. Corneal reflexes are intact. Facial movement is symmetric. Hearing is normal bilaterally. Palate is midline with normal elevation. Sternocleidomastoid and trapezius muscles are normal. Tongue is midline. Motor:  5/5 strength in upper and lower proximal and distal muscles. Normal bulk and tone. No fasciculations. No pronator drift. Reflexes:   Deep tendon reflexes 1+/4 and symmetrical. Downgoing toes. Sensory:   Normal to cold. Gait:  Normal gait. No Romberg. Can do tandem walking. Tremor:   No tremor noted. Cerebellar:  Intact FTN/ARLEN/HTS. Anterior head posture with shoulders rotated in  Positive tenderness bilateral occiput    Imaging  CT Head, head/neck CTA, brain MRI: reviewed    Labs Reviewed      Assessment:   Cervicogenic headaches  Benign paroxysmal positional vertigo  Psoriatic arthritis    Plan:   Neurological examination is nonfocal.  History and exam reveals elements of cervicogenic headaches due to poor anterior head posture. Patient was advised to correct his anterior head posture. Use headrest at work, at home and while driving. Use wireless headsets. Do not carry anything on the shoulder. Use only 1 pillow when sleeping. Advised to obtain travel pillow to prevent neck flexion. Patient provided with neck and shoulder exercises to do. Patient given referral to physical therapy for more aggressive manipulation and dry needling.   Okay to take OTC medication for relief. Exam does not reveal any brainstem or cerebellar dysfunction. Condition consistent with positional vertigo. Given exercises to do. If condition worsens advise to see Dr. Yrn Jones. Patient with known history of psoriatic arthritis. Having issues with side effects from medications used for treatment. Currently taking supplements and exercising. Patient is set to start physical therapy. Continue care with rheumatologist.  All questions and concerns were answered. Visit lasted 40 minutes. Greater than 50% was spent reviewing his hospitalization records as summarized above, discussion about his condition, etiology, prognosis, posture changes, neck and shoulder exercises, referral to physical therapy, vestibular exercises    This note was created using voice recognition software. Despite editing, there may be syntax errors.

## 2022-06-13 NOTE — PATIENT INSTRUCTIONS
[place]orne Exercises for Vertigo: Care Instructions  Your Care Instructions  Simple exercises can help you regain your balance when you have vertigo. If you have Ménière's disease, benign paroxysmal positional vertigo (BPPV), or another inner ear problem, you may have vertigo off and on. Do these exercises first thing in the morning and before you go to bed. You might get dizzy when you first start them. If this happens, try to do them for at least 5 minutes. Do a group of exercises at a time, starting at the top of the list. It may take several weeks before you can do all the exercises without feeling dizzy. Follow-up care is a key part of your treatment and safety. Be sure to make and go to all appointments, and call your doctor if you are having problems. It's also a good idea to know your test results and keep a list of the medicines you take. How can you care for yourself at home? Exercise 1  While sitting on the side of the bed and holding your head still:  · Look up as far as you can. · Look down as far as you can. · Look from side to side as far as you can. · Stretch your arm straight out in front of you. Focus on your index finger. Continue to focus on your finger while you bring it to your nose. Exercise 2  While sitting on the side of the bed:  · Bring your head as far back as you can. · Bring your head forward to touch your chin to your chest.  · Turn your head from side to side. · Do these exercises first with your eyes open. Then try with your eyes closed. Exercise 3  While sitting on the side of the bed:  · Shrug your shoulders straight upward, then relax them. · Bend over and try to touch the ground with your fingers. Then go back to a sitting position. · Toss a small ball from one hand to the other. Throw the ball higher than your eyes so you have to look up.   Exercise 4  While standing (with someone close by if you feel uncomfortable):  · Repeat Exercise 1.  · Repeat Exercise 2.  · Pass a ball between your legs and above your head. · Sit down and then stand up. Repeat. Turn around in a [place] a different way each time you stand. · With someone close by to help you, try the above exercises with your eyes closed. Exercise 5  In a room that is cleared of obstacles:  · Walk to a corner of the room, turn to your right, and walk back to the starting point. Now, repeat and turn left. · Walk up and down a slope. Now try stairs. · While holding on to someone's arm, try these exercises with your eyes closed. When should you call for help? Watch closely for changes in your health, and be sure to contact your doctor if:    · You do not get better as expected. Where can you learn more? Go to http://www.gray.com/  Enter A743 in the search box to learn more about \"Cawthorne Exercises for Vertigo: Care Instructions. \"  Current as of: September 8, 2021               Content Version: 13.2  © 2006-2022 [employer]. Care instructions adapted under license by [employer] (which disclaims liability or warranty for this information). If you have questions about a medical condition or this instruction, always ask your healthcare professional. [place] [address] any warranty or liability for your use of this information. Neck: Exercises  Introduction  Here are some examples of exercises for you to try. The exercises may be suggested for a condition or for rehabilitation. Start each exercise slowly. Ease off the exercises if you start to have pain. You will be told when to start these exercises and which ones will work best for you. How to do the exercises  Neck stretch    1. This stretch works best if you keep your shoulder down as you lean away from it. To help you remember to do this, start by relaxing your shoulders and lightly holding on to your thighs or your chair.   2. Tilt your head toward your shoulder and hold for 15 to 30 seconds. Let the weight of your head stretch your muscles. 3. If you would like a little added stretch, use your hand to gently and steadily pull your head toward your shoulder. For example, keeping your right shoulder down, lean your head to the left. 4. Repeat 2 to 4 times toward each shoulder. Diagonal neck stretch    1. Turn your head slightly toward the direction you will be stretching, and tilt your head diagonally toward your chest and hold for 15 to 30 seconds. 2. If you would like a little added stretch, use your hand to gently and steadily pull your head forward on the diagonal.  3. Repeat 2 to 4 times toward each side. Dorsal glide stretch    The dorsal glide stretches the back of the neck. If you feel pain, do not glide so far back. Some people find this exercise easier to do while lying on their backs with an ice pack on the neck. 1. Sit or stand tall and look straight ahead. 2. Slowly tuck your chin as you glide your head backward over your body  3. Hold for a count of 6, and then relax for up to 10 seconds. 4. Repeat 8 to 12 times. Chest and shoulder stretch    1. Sit or stand tall and glide your head backward as in the dorsal glide stretch. 2. Raise both arms so that your hands are next to your ears. 3. Take a deep breath, and as you breathe out, lower your elbows down and behind your back. You will feel your shoulder blades slide down and together, and at the same time you will feel a stretch across your chest and the front of your shoulders. 4. Hold for about 6 seconds, and then relax for up to 10 seconds. 5. Repeat 8 to 12 times. Strengthening: Hands on head    1. Move your head backward, forward, and side to side against gentle pressure from your hands, holding each position for about 6 seconds. 2. Repeat 8 to 12 times. Follow-up care is a key part of your treatment and safety. Be sure to make and go to all appointments, and call your doctor if you are having problems.  It's also a good idea to know your test results and keep a list of the medicines you take. Where can you learn more? Go to http://www.[employer].com/  Enter P975 in the search box to learn more about \"Neck: Exercises. \"  Current as of: July 1, 2021               Content Version: 13.2  © 2006-2022 [employer]. Care instructions adapted under license by [employer] (which disclaims liability or warranty for this information). If you have questions about a medical condition or this instruction, always ask your healthcare professional. [place] [address] any warranty or liability for your use of this information. Shoulder Blade: Exercises  Introduction  Here are some examples of exercises for you to try. The exercises may be suggested for a condition or for rehabilitation. Start each exercise slowly. Ease off the exercises if you start to have pain. You will be told when to start these exercises and which ones will work best for you. How to do the exercises  Shoulder roll    1. Stand tall with your chin slightly tucked. Imagine that a string at the top of your head is pulling you straight up. 2. Keep your arms relaxed. All motion will be in your shoulders. 3. Shrug your shoulders up toward your ears, then up and back. [place] your shoulders down and back, like you're sliding your hands down into your back pants pockets. 4. Repeat the circles at least 2 to 4 times. 5. This exercise is also helpful anytime you want to relax. Lower neck and upper back stretch    1. With your arms about shoulder height, clasp your hands in front of you. 2. Drop your chin toward your chest.  3. Reach straight forward so you are rounding your upper back. Think about pulling your shoulder blades apart. Christelle Lindquist feel a stretch across your upper back and shoulders. Hold for at least 6 seconds. 4. Repeat 2 to 4 times. Triceps stretch    1.  Reach your arm straight up.  2. Keeping your elbow in place, bend your arm and reach your hand down behind your back. 3. With your other hand, apply gentle pressure to the bent elbow. Jennifer [place] feel a stretch at the back of your upper arm and shoulder. Hold about 6 seconds. 4. Repeat 2 to 4 times with each arm. Shoulder stretch    1. Relax your shoulders. 2. Raise one arm to shoulder height, and reach it across your chest.  3. Pull the arm slightly toward you with your other arm. This will help you get a gentle stretch. Hold for about 6 seconds. 4. Repeat 2 to 4 times. Shoulder blade squeeze    1. Sit or stand up tall with your arms at your sides. 2. Keep your shoulders relaxed and down, not shrugged. 3. Squeeze your shoulder blades together. Hold for 6 seconds, then relax. 4. Repeat 8 to 12 times. Straight-arm shoulder blade squeeze    1. Sit or stand tall. Relax your shoulders. 2. With palms down, hold your elastic tubing or band straight out in front of you. 3. Start with slight tension in the tubing or band, with your hands about shoulder-width apart. 4. Slowly pull straight out to the sides, squeezing your shoulder blades together. Keep your arms straight and at shoulder height. Slowly release. 5. Repeat 8 to 12 times. Rowing    1. [place] your elastic tubing or band at about waist height. Take one end in each hand. 2. Sit or stand with your feet hip-width apart. 3. Hold your arms straight in front of you. Adjust your distance to create slight tension in the tubing or band. 4. Slightly tuck your chin. Relax your shoulders. 5. Without shrugging your shoulders, pull straight back. Your elbows will pass alongside your waist.  Pull-downs    1. [place] your elastic tubing or band in the top of a closed door. Take one end in each hand. 2. Either sit or stand, depending on what is more comfortable. If you feel unsteady, sit on a chair. 3. Start with your arms up and comfortably apart, elbows straight.  There should be a slight tension in the tubing or band. 4. Slightly tuck your chin, and look straight ahead. 5. Keeping your back straight, slowly pull down and back, bending your elbows. 6. Stop where your hands are level with your chin, in a \"goalpost\" position. 7. Repeat 8 to 12 times. Chest T stretch    1. Lie on your back. Raise your knees so they are bent. Plant your feet on the floor, hip-width apart. 2. Tuck your chin, and relax your shoulders. 3. Reach your arms straight out to the sides. If you don't feel a mild stretch in your shoulders and across your chest, use a foam roll or a tightly rolled blanket under your spine, from your tailbone to your head. 4. Relax in this position for at least 15 to 30 seconds while you breathe normally. Repeat 2 to 4 times. 5. As you get used to this stretch, keep adding a little more time until you are able relax in this position for 2 or 3 minutes. When you can relax for at least 2 minutes, you only need to do the exercise 1 time per session. Chest goalpost stretch    1. Lie on your back. Raise your knees so they are bent. Plant your feet on the floor, hip-width apart. 2. Tuck your chin, and relax your shoulders. 3. Reach your arms straight out to the sides. 4. Bend your arms at the elbows, with your hands pointed toward the top of your head. Your arms should make an L on either side of your head. Your palms should be facing up. 5. If you don't feel a mild stretch in your shoulders and across your chest, use a foam roll or tightly rolled blanket under your spine, from your tailbone to your head. 6. Relax in this position for at least 15 to 30 seconds while you breathe normally. Repeat 2 to 4 times. 7. Each day you do this exercise, add a little more time until you can relax in this position for 2 or 3 minutes. When you can relax for at least 2 minutes, you only need to do the exercise 1 time per session. Follow-up care is a key part of your treatment and safety.  Be sure to make and go to all appointments, and call your doctor if you are having problems. It's also a good idea to know your test results and keep a list of the medicines you take. Where can you learn more? Go to http://www.gray.com/  Enter H745 in the search box to learn more about \"Shoulder Blade: Exercises. \"  Current as of: July 1, 2021               Content Version: 13.2  © 2006-2022 [employer]wise, [employer]. Care instructions adapted under license by [employer] (which disclaims liability or warranty for this information). If you have questions about a medical condition or this instruction, always ask your healthcare professional. [address] any warranty or liability for your use of this information.

## 2022-06-13 NOTE — PROGRESS NOTES
Headaches- have been on and off nothing to crazy, haven't had any blurred vision   Mild headaches   Roughly maybe 2-3 a week, lasting a few hours with medication   Excedrin does help